# Patient Record
Sex: FEMALE | Race: WHITE | NOT HISPANIC OR LATINO | Employment: OTHER | URBAN - METROPOLITAN AREA
[De-identification: names, ages, dates, MRNs, and addresses within clinical notes are randomized per-mention and may not be internally consistent; named-entity substitution may affect disease eponyms.]

---

## 2017-11-03 ENCOUNTER — ALLSCRIPTS OFFICE VISIT (OUTPATIENT)
Dept: OTHER | Facility: OTHER | Age: 72
End: 2017-11-03

## 2017-11-03 ENCOUNTER — GENERIC CONVERSION - ENCOUNTER (OUTPATIENT)
Dept: OTHER | Facility: OTHER | Age: 72
End: 2017-11-03

## 2017-11-04 NOTE — PROGRESS NOTES
Assessment  1  Visit for screening mammogram (V76 12) (Z12 31)   2  Encounter for routine gynecological examination (V72 31) (Z01 419)    Pelvic exam revealed the uterus to be anterior mobile normal size  No adnexal masses are identified  Cervix is normal to appearance  The vaginal mucosa is atrophic  There is presence of adhesions in the posterior vagina  There are small cysts on the left labia majora  They are not infected whitish in color  They're not painful  Plan  Encounter for routine gynecological examination    · Follow-up visit in 1 year Evaluation and Treatment  Follow-up  Status: Hold For -  Scheduling  Requested for: [id number]   Ordered; For: Encounter for routine gynecological examination; Ordered By: [facility] Performed:  Due: 45NRQ4948  Encounter for screening mammogram for malignant neoplasm of breast    · * MAMMO SCREENING BILATERAL W CAD; Status:Hold For - Scheduling; Requested  for:57Ecv2514;    Perform:[facility]; [phone]; Ordered;For:Encounter for screening mammogram for malignant neoplasm of breast; Ordered By:Romel Munoz; No Pap smear was taken at this visit  The patient was given a slip for bilateral screening mammogram to be performed after December 9, 2017  She will return in one year for follow-up GYN evaluation due to her labial cysts  Discussion/Summary    The patient was told that her breast and pelvic exam were normal and that she did have 2 small sebaceous cysts on her left labia majora  This requires no treatment  She will call if any pain occurs in the area of the cysts  Chief Complaint  Pt is here for breast exam       History of Present Illness  HPI: This 77-year-old patient has had no vaginal bleeding or episodes of vaginitis over the past year  She has no pain with intercourse  Does have recurrent sinus infections during the course of the year and responds well to amoxicillin 500 mg every 8 hours for 10 days   She was given a prescription for this to be taken as needed for sinus infections with 5 refills  She will continue with Zoloft 50 mg a day  Her bowel and bladder function are normal    GYN HM, Adult Female [facility]: The patient is being seen for a gynecology evaluation  General Health: The patient's health since the last visit is described as good  Lifestyle:  She consumes a diverse and healthy diet  -- She does not have any weight concerns  -- She exercises regularly  She exercises 3 or more times per week  Exercise includes walking,-- aerobic conditioning-- and-- stretching/yoga/pilates  -- She does not use tobacco  The patient has never smoked cigarettes  -- She consumes alcohol  She reports occasional alcohol use  She typically drinks beer-- and-- wine  -- She denies drug use  She has never used illicit drugs  Reproductive health: the patient is postmenopausal--   she is sexually active  Screening: Cervical cancer screening includes a pap smear performed 10/28/16  Breast cancer screening includes a mammogram performed 12/9/16  Colorectal cancer screening includes a colonoscopy performed 2009  Metabolic screening includes uncertain timing of her last DEXA  Review of Systems    Constitutional: No fever, no chills, feels well, no tiredness, no recent weight gain or loss  ENT: no ear ache, no loss of hearing, no nosebleeds or nasal discharge, no sore throat or hoarseness  Cardiovascular: no complaints of slow or fast heart rate, no chest pain, no palpitations, no leg claudication or lower extremity edema  Respiratory: no complaints of shortness of breath, no wheezing, no dyspnea on exertion, no orthopnea or PND  Breasts: no complaints of breast pain, breast lump or nipple discharge  Gastrointestinal: no complaints of abdominal pain, no constipation, no nausea or diarrhea, no vomiting, no bloody stools     Genitourinary: no complaints of dysuria, no incontinence, no pelvic pain, no dysmenorrhea, no vaginal discharge or abnormal vaginal bleeding  Musculoskeletal: no complaints of arthralgia, no myalgia, no joint swelling or stiffness, no limb pain or swelling  Integumentary: no complaints of skin rash or lesion, no itching or dry skin, no skin wounds  Neurological: no complaints of headache, no confusion, no numbness or tingling, no dizziness or fainting  Active Problems  1  Anxiety (300 00) (F41 9)   2  Depression (311) (F32 9)   3  Encounter for routine gynecological examination (V72 31) (Z01 419)   4  Encounter for routine gynecological examination (V72 31) (Z01 419)   5  Encounter for screening mammogram for malignant neoplasm of breast (V76 12)   (Z12 31)   6  Hot Flashes   7  Pap smear, as part of routine gynecological examination (V76 2) (Z01 419)   8  Visit for screening mammogram (V76 12) (Z12 31)    Family History  Mother    · Family history of High cholesterol   · Family history of Hypertension  Father    · Family history of High cholesterol   · Family history of Hypertension    Social History   · Being A Social Drinker   · Childlessness, voluntary (V25 9) (Z30 9)   · History of Childlessness, voluntary (V25 9) (Z30 9)   · Exercising Regularly   · Never A Smoker    Current Meds   1  Lisinopril 10 MG Oral Tablet; Therapy: (Recorded:15Oct2013) to Recorded   2  Sertraline HCl - 50 MG Oral Tablet; Take 1 tablet daily; Therapy: 51NSP8725 to (03 17 74 30 53)  Requested for: 28Oct2016; Last   Rx:28Oct2016 Ordered    Allergies  1  No Known Drug Allergies    Vitals   Recorded: 76ZXO0578 80:89IA   Systolic 775, LUE, Sitting   Diastolic 60, LUE, Sitting   Height 5 ft 6 5 in   Weight 126 lb    BMI Calculated 20 03   BSA Calculated 1 65   LMP      Physical Exam    Constitutional   General appearance: No acute distress, well appearing and well nourished  Neck   Neck: Normal, supple, trachea midline, no masses  Thyroid: Normal, no thyromegaly      Pulmonary   Respiratory effort: No increased work of breathing or signs of respiratory distress  Auscultation of lungs: Clear to auscultation  Cardiovascular   Auscultation of heart: Normal rate and rhythm, normal S1 and S2, no murmurs  Peripheral vascular exam: Normal pulses Throughout  Genitourinary   External genitalia: Abnormal  -- There are 2 small whitish sebaceous cysts on the left labia majora  They're not painful or bleeding or drainage  Vagina: Normal, no lesions or dryness appreciated  Urethra: Normal     Urethral meatus: Normal     Bladder: Normal, soft, non-tender and no prolapse or masses appreciated  Cervix: Normal, no palpable masses  Uterus: Normal, non-tender, not enlarged, and no palpable masses  Adnexa/parametria: Normal, non-tender and no fullness or masses appreciated  Anus, perineum, and rectum: Normal sphincter tone, no masses, and no prolapse  Chest   Breasts: Normal and no dimpling or skin changes noted  Abdomen   Abdomen: Normal, non-tender, and no organomegaly noted  Liver and spleen: No hepatomegaly or splenomegaly  Examination for hernias: No hernias appreciated  Lymphatic   Palpation of lymph nodes in neck, axillae, groin and/or other locations: No lymphadenopathy or masses noted  Skin   Skin and subcutaneous tissue: Normal skin turgor and no rashes      Palpation of skin and subcutaneous tissue: Normal     Psychiatric   Orientation to person, place, and time: Normal     Mood and affect: Normal        Signatures   Electronically signed by : Milena Ellis MD; Nov  3 2017  1:25PM EST                       (Author)

## 2017-12-08 ENCOUNTER — HOSPITAL ENCOUNTER (OUTPATIENT)
Dept: RADIOLOGY | Facility: HOSPITAL | Age: 72
Discharge: HOME | End: 2017-12-08
Payer: COMMERCIAL

## 2017-12-08 DIAGNOSIS — Z12.31 ENCOUNTER FOR SCREENING MAMMOGRAM FOR MALIGNANT NEOPLASM OF BREAST: ICD-10-CM

## 2017-12-08 PROCEDURE — G0202 SCR MAMMO BI INCL CAD: HCPCS

## 2017-12-09 DIAGNOSIS — Z12.31 ENCOUNTER FOR SCREENING MAMMOGRAM FOR MALIGNANT NEOPLASM OF BREAST: ICD-10-CM

## 2018-01-10 NOTE — RESULT NOTES
Verified Results  * MAMMO SCREENING BILATERAL W CAD [id number] 12:32PM Lobito Love Order Number: [id number]    - Patient Instructions: To schedule this appointment, please contact Central Scheduling at [phone]  Do not wear any perfume, powder, lotion or deodorant on breast or underarm area  Please bring your doctors order, referral (if needed) and insurance information with you on the day of the test  Failure to bring this information may result in this test being rescheduled  Arrive 15 minutes prior to your appointment time to register  On the day of your test, please bring any prior mammogram or breast studies with you that were not performed at a [facility]  Failure to bring prior exams may result in your test needing to be rescheduled   Order Number: [id number]    - Patient Instructions: To schedule this appointment, please contact Central Scheduling at [phone]  Do not wear any perfume, powder, lotion or deodorant on breast or underarm area  Please bring your doctors order, referral (if needed) and insurance information with you on the day of the test  Failure to bring this information may result in this test being rescheduled  Arrive 15 minutes prior to your appointment time to register  On the day of your test, please bring any prior mammogram or breast studies with you that were not performed at a [facility]  Failure to bring prior exams may result in your test needing to be rescheduled  Test Name Result Flag Reference   MAMMO SCREENING BILATERAL W CAD (Report)     Patient History:   Patient is postmenopausal and is nulliparous  Family history of breast cancer in paternal cousin at age 72  Benign core biopsy of the left breast, 1994  Took estrogen for 10 years beginning at age 48  Took    progesterone for 10 years beginning at age 48  Patient has never smoked  Patient's BMI is 20 0       Reason for exam: screening (asymptomatic)  Mammo Screening Bilateral W CAD: December 9, 2016 - Check In #:    [de-identified]   Bilateral MLO, CC, and XCCL view(s) were taken  Technologist: RT Larisa(R)(M)   Prior study comparison: December 4, 2015, bilateral digital    screening mammogram performed at [address] [place] [address]     November 21, 2014, bilateral digital screening mammogram    performed at [address] [place] [address]  November 20, 2013,    bilateral digital screening mammogram performed at [address] [place] [address]  November 19, 2012, bilateral digital    screening mammogram performed at [address] [place] [address]      There are scattered fibroglandular densities  The parenchymal pattern appears stable  No dominant soft tissue    mass or suspicious calcifications are noted  The skin and nipple   contours are within normal limits  No mammographic evidence of malignancy  No    significant changes when compared with prior studies  ASSESSMENT: BiRad:1 - Negative     Recommendation:   Routine screening mammogram in 1 year  A reminder letter will be   scheduled  Analyzed by CAD     8-10% of cancers will be missed on mammography  Management of a    palpable abnormality must be based on clinical grounds  Patients   will be notified of their results via letter from our facility  Accredited by [employer] and FDA       Transcription Location: [facility] 98: [id number]     Risk Value(s):   Tyrer-Cuzick 10 Year: 2 893%, Tyrer-Cuzick Lifetime: 4 933%,    Myriad Table: 1 5%, BATOOL 5 Year: 2 2%, NCI Lifetime: 6 8%   Signed by:   Moises Davila MD   12/9/16

## 2018-01-13 NOTE — MISCELLANEOUS
Message   Recorded as Task   Date: 12/13/2016 10:53 AM, Created By: Samir Nuñez   Task Name: Med Renewal Request   Assigned To: Mickeythu Chongameena   Regarding Patient: Francisco Javier Sampson, Status: In Progress   Salvatore Short - 13 Dec 2016 10:53 AM     TASK CREATED  Caller: Self; [phone] (Home); [phone] (Work)  pt called - she needs a refill on her sertraline and she needs it to be for 3 months at a time, not one month at a time  pharmacy is [facility]  please advise [phone] or call pt at work until [address] [place] [address] - 13 Dec 2016 10:56 AM     TASK IN PROGRESS   Yris Jimenez - 13 Dec 2016 11:02 AM     TASK EDITED  lm for that refilled prescription  90 day, 3 r/f  Active Problems    1  Anxiety (300 00) (F41 9)   2  Depression (311) (F32 9)   3  Encounter for routine gynecological examination (V72 31) (Z01 419)   4  Encounter for routine gynecological examination (V72 31) (Z01 419)   5  Encounter for screening mammogram for malignant neoplasm of breast (V76 12)   (Z12 31)   6  Hot Flashes   7  Pap smear, as part of routine gynecological examination (V76 2) (Z01 419)   8  Visit for screening mammogram (V76 12) (Z12 31)    Current Meds   1  Lisinopril 10 MG Oral Tablet; Therapy: (Recorded:15Oct2013) to Recorded   2  Sertraline HCl - 50 MG Oral Tablet; TAKe 1 od; Therapy: 29AKZ0779 to (Evaluate:20Nmz1624)  Requested for: 12YTK4229; Last   Rx:25Mar2015 Ordered   3  Sertraline HCl - 50 MG Oral Tablet; Take 1 tablet daily; Therapy: 39BIV3715 to [phone] 86)  Requested for: 444 14 907; Last   Rx:26Oct2015 Ordered   4  Sertraline HCl - 50 MG Oral Tablet; Take 1 tablet daily; Therapy: 03YZG6374 to (03 17 74 30 53)  Requested for: 53Osz8179; Last   Rx:28Oct2016 Ordered    Allergies    1   No Known Drug Allergies    Plan  Anxiety, Depression    · Sertraline HCl - 50 MG Oral Tablet; TAKe 1 od    Signatures   Electronically signed by : Aiden Amaral, ; Dec 13 2016 11:02AM EST                       (Author)

## 2018-01-14 VITALS
HEIGHT: 67 IN | DIASTOLIC BLOOD PRESSURE: 60 MMHG | SYSTOLIC BLOOD PRESSURE: 140 MMHG | WEIGHT: 126 LBS | BODY MASS INDEX: 19.78 KG/M2

## 2018-01-16 NOTE — RESULT NOTES
Verified Results  (B) PAP (RFLX HPV PLUS WHEN ASCUS) NON-[place] 28Oct2016 01:26PM Ria Lopez     Test Name Result Flag Reference   PAP, LIQUID-BASED NILM     DIAGNOSIS:            Negative for intraepithelial lesion or malignancy  ADEQUACY:             Satisfactory for evaluation / Transformation zone                         component cannot be adequately evaluated due to                         atrophic smear  COMMENT:              This Pap smear was screened with the assistance                         of the CircularPrep(TM) Imaging System and                         screened by a cytotechnologist   SPECIMEN SOURCE:      PAP (RFLX HPV PLUS WHEN ASCUS) NON-[place], CERVIX  CLINICAL INFORMATION: LMP: N/A                        Post menopausal                        Provided Diagnosis Codes: Z01 419                                                Cervicovaginal cytology should be considered a                         screening procedure subject to false negatives                         and false positives  Results are more reliable                         when a satisfactory sample is obtained on a                         regular repetitive basis, and should be                         interpreted together with past and current                         clinical data    ELECTRONICALLY SIGNED   BY:                   Rescreened By: KARNE Vera (ASCP)   Case                         Electronically Signed 11/03/2016

## 2018-01-23 NOTE — RESULT NOTES
Verified Results  * MAMMO SCREENING BILATERAL W CAD [id number] 09:17AM Vida Armenta Order Number: [id number]    - Patient Instructions: To schedule this appointment, please contact Central Scheduling at [phone]  Do not wear any perfume, powder, lotion or deodorant on breast or underarm area  Please bring your doctors order, referral (if needed) and insurance information with you on the day of the test  Failure to bring this information may result in this test being rescheduled  Arrive 15 minutes prior to your appointment time to register  On the day of your test, please bring any prior mammogram or breast studies with you that were not performed at a [facility]  Failure to bring prior exams may result in your test needing to be rescheduled  Test Name Result Flag Reference   MAMMO SCREENING BILATERAL W CAD (Report)     Patient History:   Patient is postmenopausal and is nulliparous  Family history of breast cancer at age 72 in paternal cousin  Benign core biopsy of the left breast, 1994  Took estrogen for 10 years beginning at age 48  Took    progesterone for 10 years beginning at age 48  Patient has never smoked  Patient's BMI is 19 6  Reason for exam: screening, asymptomatic  Mammo Screening Bilateral W CAD: December 8, 2017 - Check In #:    [de-identified]   Bilateral MLO, CC, and XCCL view(s) were taken  Technologist: RT Alec(R)(M)   Prior study comparison: December 9, 2016, mammo screening    bilateral W CAD performed at [address] [place] [address]     December 4, 2015, bilateral digital screening mammogram performed   at [address] [place] [address]      There are scattered fibroglandular densities  The parenchymal pattern appears stable  No dominant soft tissue    mass or suspicious calcifications are noted  The skin and nipple   contours are within normal limits  No mammographic evidence of malignancy   No    significant changes when compared with prior studies  ACR BI-RADSï¾® Assessments: BiRad:1 - Negative     Recommendation:   Routine screening mammogram in 1 year  Analyzed by CAD     The patient is scheduled in a reminder system for screening    mammography  8-10% of cancers will be missed on mammography  Management of a    palpable abnormality must be based on clinical grounds  Patients   will be notified of their results via letter from our facility  Accredited by [employer] and FDA       Transcription Location: [facility] 98: [id number]     Risk Value(s):   Tyrer-Cuzick 10 Year: 2 800%, Tyrer-Cuzick Lifetime: 4 400%,    Myriad Table: 1 5%, BATOOL 5 Year: 2 2%, NCI Lifetime: 6 5%   Signed by:   Rafita Ha MD   12/8/17

## 2018-07-31 ENCOUNTER — TELEPHONE (OUTPATIENT)
Dept: FAMILY MEDICINE CLINIC | Facility: CLINIC | Age: 73
End: 2018-07-31

## 2018-07-31 NOTE — TELEPHONE ENCOUNTER
Called patient left her a message that the appointment for 9/14/18 at 8 am with dr Michael Julian is cancelled he will not be in the office this day and to call the office back to make another appointment for 6 mo to see dr Michael Julian

## 2018-09-07 ENCOUNTER — OFFICE VISIT (OUTPATIENT)
Dept: FAMILY MEDICINE CLINIC | Facility: CLINIC | Age: 73
End: 2018-09-07
Payer: COMMERCIAL

## 2018-09-07 ENCOUNTER — TELEPHONE (OUTPATIENT)
Dept: FAMILY MEDICINE CLINIC | Facility: CLINIC | Age: 73
End: 2018-09-07

## 2018-09-07 VITALS
BODY MASS INDEX: 19.98 KG/M2 | DIASTOLIC BLOOD PRESSURE: 80 MMHG | HEART RATE: 80 BPM | HEIGHT: 67 IN | WEIGHT: 127.3 LBS | RESPIRATION RATE: 18 BRPM | SYSTOLIC BLOOD PRESSURE: 130 MMHG

## 2018-09-07 DIAGNOSIS — K63.5 POLYP OF COLON, UNSPECIFIED PART OF COLON, UNSPECIFIED TYPE: ICD-10-CM

## 2018-09-07 DIAGNOSIS — Z91.09 POLLEN ALLERGIES: ICD-10-CM

## 2018-09-07 DIAGNOSIS — Z23 IMMUNIZATION DUE: ICD-10-CM

## 2018-09-07 DIAGNOSIS — M85.859 OSTEOPENIA OF NECK OF FEMUR, UNSPECIFIED LATERALITY: ICD-10-CM

## 2018-09-07 DIAGNOSIS — J30.9 ALLERGIC RHINITIS, UNSPECIFIED SEASONALITY, UNSPECIFIED TRIGGER: ICD-10-CM

## 2018-09-07 DIAGNOSIS — I10 ESSENTIAL HYPERTENSION: Primary | ICD-10-CM

## 2018-09-07 LAB
ANION GAP SERPL CALCULATED.3IONS-SCNC: 3 MMOL/L (ref 4–13)
BUN SERPL-MCNC: 18 MG/DL (ref 5–25)
CALCIUM SERPL-MCNC: 9.2 MG/DL (ref 8.3–10.1)
CHLORIDE SERPL-SCNC: 105 MMOL/L (ref 100–108)
CO2 SERPL-SCNC: 30 MMOL/L (ref 21–32)
CREAT SERPL-MCNC: 0.86 MG/DL (ref 0.6–1.3)
CREAT UR-MCNC: 206 MG/DL
GFR SERPL CREATININE-BSD FRML MDRD: 68 ML/MIN/1.73SQ M
GLUCOSE SERPL-MCNC: 97 MG/DL (ref 65–140)
MICROALBUMIN UR-MCNC: 15.5 MG/L (ref 0–20)
MICROALBUMIN/CREAT 24H UR: 8 MG/G CREATININE (ref 0–30)
POTASSIUM SERPL-SCNC: 5.6 MMOL/L (ref 3.5–5.3)
SODIUM SERPL-SCNC: 138 MMOL/L (ref 136–145)

## 2018-09-07 PROCEDURE — 90662 IIV NO PRSV INCREASED AG IM: CPT

## 2018-09-07 PROCEDURE — 99214 OFFICE O/P EST MOD 30 MIN: CPT | Performed by: FAMILY MEDICINE

## 2018-09-07 PROCEDURE — 36415 COLL VENOUS BLD VENIPUNCTURE: CPT | Performed by: FAMILY MEDICINE

## 2018-09-07 PROCEDURE — 1160F RVW MEDS BY RX/DR IN RCRD: CPT | Performed by: FAMILY MEDICINE

## 2018-09-07 PROCEDURE — 80048 BASIC METABOLIC PNL TOTAL CA: CPT | Performed by: FAMILY MEDICINE

## 2018-09-07 PROCEDURE — 3075F SYST BP GE 130 - 139MM HG: CPT | Performed by: FAMILY MEDICINE

## 2018-09-07 PROCEDURE — 82043 UR ALBUMIN QUANTITATIVE: CPT | Performed by: FAMILY MEDICINE

## 2018-09-07 PROCEDURE — 1160F RVW MEDS BY RX/DR IN RCRD: CPT

## 2018-09-07 PROCEDURE — 82570 ASSAY OF URINE CREATININE: CPT | Performed by: FAMILY MEDICINE

## 2018-09-07 PROCEDURE — G0008 ADMIN INFLUENZA VIRUS VAC: HCPCS

## 2018-09-07 PROCEDURE — 3079F DIAST BP 80-89 MM HG: CPT | Performed by: FAMILY MEDICINE

## 2018-09-07 PROCEDURE — 3008F BODY MASS INDEX DOCD: CPT | Performed by: FAMILY MEDICINE

## 2018-09-07 RX ORDER — OLOPATADINE HYDROCHLORIDE 7 MG/ML
SOLUTION OPHTHALMIC
COMMUNITY
Start: 2018-07-12 | End: 2018-09-07 | Stop reason: SDUPTHER

## 2018-09-07 RX ORDER — FLUOROMETHOLONE 0.1 %
SUSPENSION, DROPS(FINAL DOSAGE FORM)(ML) OPHTHALMIC (EYE) EVERY 24 HOURS
COMMUNITY
End: 2019-04-18 | Stop reason: ALTCHOICE

## 2018-09-07 RX ORDER — OLOPATADINE HYDROCHLORIDE 7 MG/ML
1 SOLUTION OPHTHALMIC DAILY
Qty: 1 BOTTLE | Refills: 12 | Status: SHIPPED | OUTPATIENT
Start: 2018-09-07 | End: 2020-01-10

## 2018-09-07 RX ORDER — LISINOPRIL 10 MG/1
TABLET ORAL
COMMUNITY
End: 2018-10-17 | Stop reason: SDUPTHER

## 2018-09-07 NOTE — PATIENT INSTRUCTIONS
Try to get colonoscopy completed after all the dental work  Continue your nice activity  IF YOU HAVE NEVER HAD A TDAP SHOT (TETANUS/DIPHTHERIA/PERTUSSIS)  TALK TO YOUR PHARMACIST ABOUT GETTING ONE : GOOD FOR 10 YRS:ESPECIALLY IMPORTATN IF YOU HAVE YOUNG CHILDREN OR GRANDCHILDREN    GET A YEARLY FLU SHOT IN THE FALL : IF OVER 65 GET "HIGH DOSE"     ASK PHARMACIST ABOUT "SHINGRIX" THE NEW SHINGLES VACCINE IF OVER AGE 50

## 2018-09-07 NOTE — TELEPHONE ENCOUNTER
Pt called stating she received a message but was not sure what it was for  Pt was here for office visit today  She was not sure if it was regarding bloodwork or urine  No message was in chart  She can be called at her work number [phone]

## 2018-09-07 NOTE — PROGRESS NOTES
Assessment/Plan:    Osteopenia  Last scan : 2012       Diagnoses and all orders for this visit:    Essential hypertension  -     influenza vaccine, [id number], quadrivalent, 0 5 mL, preservative-free (SYRINGE, SINGLE-DOSE VIAL), for adult and pediatric patients 3 yr+ (AFLURIA, FLUARIX, FLULAVAL, FLUZONE)  -     Microalbumin / creatinine urine ratio  -     Basic metabolic panel  -     DXA bone density spine hip and pelvis; Future    Allergic rhinitis, unspecified seasonality, unspecified trigger  -     PAZEO 0 7 % SOLN; Apply 1 drop to eye daily    Polyp of colon, unspecified part of colon, unspecified type    Osteopenia of neck of femur, unspecified laterality  -     DXA bone density spine hip and pelvis; Future    Pollen allergies  -     PAZEO 0 7 % SOLN; Apply 1 drop to eye daily          Subjective:      Patient ID: Kostas Thomas is a 72 y o  female  HPI    The following portions of the patient's history were reviewed and updated as appropriate: allergies, current medications, past family history, past medical history, past social history, past surgical history and problem list     Review of Systems   Constitutional: Negative for activity change and appetite change  HENT: Negative for voice change  Eyes: Negative for visual disturbance  Respiratory: Negative for chest tightness and shortness of breath  Cardiovascular: Negative for chest pain, palpitations and leg swelling  Gastrointestinal: Negative for abdominal pain, blood in stool, constipation and diarrhea  Genitourinary: Negative for dysuria, vaginal bleeding and vaginal discharge  Skin: Negative for rash  Neurological: Negative for dizziness  Psychiatric/Behavioral: Negative for dysphoric mood           Objective:  Vitals:    09/07/18 1042   BP: 130/80   BP Location: Left arm   Patient Position: Sitting   Cuff Size: Standard   Pulse: 80   Resp: 18   Weight: 57 7 kg (127 lb 4 8 oz)   Height: 5' 6 53" (1 69 m)      Physical Exam Constitutional: She appears well-developed and well-nourished  HENT:   Head: Normocephalic and atraumatic  Eyes: Conjunctivae are normal    Neck: Neck supple  No thyromegaly present  Cardiovascular: Normal rate, regular rhythm, normal heart sounds and intact distal pulses  No murmur heard  Pulmonary/Chest: Effort normal and breath sounds normal  No respiratory distress  Musculoskeletal: She exhibits no edema  Lymphadenopathy:     She has no cervical adenopathy  Skin: Skin is warm and dry  Psychiatric: She has a normal mood and affect  Her behavior is normal            Chronic medical issues are all stable today    We will secure a DEXA bone scan to follow-up the osteopenia medications are going to be the same surveillance labs were performed ER

## 2018-10-17 DIAGNOSIS — I10 ESSENTIAL HYPERTENSION, BENIGN: Primary | ICD-10-CM

## 2018-10-17 RX ORDER — LISINOPRIL 10 MG/1
10 TABLET ORAL DAILY
Qty: 90 TABLET | Refills: 3 | Status: SHIPPED | OUTPATIENT
Start: 2018-10-17 | End: 2019-09-23 | Stop reason: SDUPTHER

## 2018-11-16 ENCOUNTER — ANNUAL EXAM (OUTPATIENT)
Dept: OBGYN CLINIC | Facility: CLINIC | Age: 73
End: 2018-11-16
Payer: COMMERCIAL

## 2018-11-16 VITALS
DIASTOLIC BLOOD PRESSURE: 72 MMHG | BODY MASS INDEX: 20.89 KG/M2 | HEIGHT: 66 IN | SYSTOLIC BLOOD PRESSURE: 118 MMHG | WEIGHT: 130 LBS

## 2018-11-16 DIAGNOSIS — Z01.419 ENCOUNTER FOR GYNECOLOGICAL EXAMINATION (GENERAL) (ROUTINE) WITHOUT ABNORMAL FINDINGS: ICD-10-CM

## 2018-11-16 DIAGNOSIS — J32.9 CHRONIC SINUSITIS, UNSPECIFIED LOCATION: ICD-10-CM

## 2018-11-16 DIAGNOSIS — F41.9 ANXIETY: ICD-10-CM

## 2018-11-16 DIAGNOSIS — Z12.31 ENCOUNTER FOR SCREENING MAMMOGRAM FOR MALIGNANT NEOPLASM OF BREAST: Primary | ICD-10-CM

## 2018-11-16 PROCEDURE — S0612 ANNUAL GYNECOLOGICAL EXAMINA: HCPCS | Performed by: OBSTETRICS & GYNECOLOGY

## 2018-11-16 PROCEDURE — G0145 SCR C/V CYTO,THINLAYER,RESCR: HCPCS | Performed by: OBSTETRICS & GYNECOLOGY

## 2018-11-16 PROCEDURE — 4040F PNEUMOC VAC/ADMIN/RCVD: CPT | Performed by: OBSTETRICS & GYNECOLOGY

## 2018-11-16 RX ORDER — AMOXICILLIN 500 MG/1
500 CAPSULE ORAL EVERY 8 HOURS SCHEDULED
Qty: 30 CAPSULE | Refills: 5 | Status: SHIPPED | OUTPATIENT
Start: 2018-11-16 | End: 2018-11-26

## 2018-11-16 NOTE — PROGRESS NOTES
Assessment/Plan: This 79-year-old patient is seen today for her annual gyn evaluation  She has no specific complaints at this time  No problem-specific Assessment & Plan notes found for this encounter  Subjective:      Patient ID: Carrie  is a 72 y o  female  This 79-year-old patient has had no vaginal bleeding or episodes of vaginitis over the past year  She does use a lubricant with intercourse and is comfortable  She has no recurrence hot flashes headaches or fainting spells  She sleeps about 8 hours at night  Her bowel function is normal   She has had no urinary tract infections over the year  She does not wear liners or pads for urine stress incontinence  She has had 2 sinus infections over the past year  Gynecologic Exam             Review of Systems   Constitutional: Negative  HENT: Negative  Eyes: Negative  Respiratory: Negative  Cardiovascular: Negative  Gastrointestinal: Negative  Endocrine: Negative  Genitourinary: Negative  Musculoskeletal: Negative  Skin: Negative  Allergic/Immunologic: Negative  Neurological: Negative  Hematological: Negative  Psychiatric/Behavioral: Negative  Objective:      /72   Ht 5' 6" (1 676 m)   Wt 59 kg (130 lb)   BMI 20 98 kg/m²          Physical Exam   Constitutional: She is oriented to person, place, and time  She appears well-developed and well-nourished  HENT:   Head: Normocephalic  Neck: Normal range of motion  Neck supple  Cardiovascular: Normal rate, regular rhythm, normal heart sounds and intact distal pulses  Pulmonary/Chest: Effort normal and breath sounds normal    Abdominal: Soft  Bowel sounds are normal    Genitourinary: Uterus normal    Musculoskeletal: Normal range of motion  Neurological: She is alert and oriented to person, place, and time  Skin: Skin is warm and dry  Psychiatric: She has a normal mood and affect  Nursing note and vitals reviewed  breast exam is normal   Pelvic exam shows uterus to be anterior mobile normal size and well supported  No adnexal masses are identified  There is no blood or discharge in the vagina  The vulva is normal  Rectal exam shows no masses or blood in the rectum and no nodularity in the cul-de-sac

## 2018-11-16 NOTE — PATIENT INSTRUCTIONS
This 27-year-old patient was told that her breast and pelvic exam are normal  She will call if she sees any vaginal bleeding over the next year  She does have 2 white sebaceous cysts on the left labia majora which were there a year ago  There is no difference

## 2018-11-27 LAB
LAB AP GYN PRIMARY INTERPRETATION: NORMAL
Lab: NORMAL

## 2018-12-11 ENCOUNTER — TELEPHONE (OUTPATIENT)
Dept: FAMILY MEDICINE CLINIC | Facility: CLINIC | Age: 73
End: 2018-12-11

## 2018-12-11 NOTE — TELEPHONE ENCOUNTER
Called patient left a message that her appointment on 9/13/19 at 8 am with dr Tiffany Sanderson is cancelled that he will not be in the office this day and to call the office back to make another appointment to have physical done

## 2018-12-20 ENCOUNTER — HOSPITAL ENCOUNTER (OUTPATIENT)
Dept: RADIOLOGY | Facility: HOSPITAL | Age: 73
Discharge: HOME | End: 2018-12-20
Payer: COMMERCIAL

## 2018-12-20 VITALS — BODY MASS INDEX: 19.62 KG/M2 | HEIGHT: 67 IN | WEIGHT: 125 LBS

## 2018-12-20 DIAGNOSIS — Z12.31 ENCOUNTER FOR SCREENING MAMMOGRAM FOR MALIGNANT NEOPLASM OF BREAST: ICD-10-CM

## 2018-12-20 PROCEDURE — 77067 SCR MAMMO BI INCL CAD: CPT

## 2019-04-18 ENCOUNTER — TELEPHONE (OUTPATIENT)
Dept: FAMILY MEDICINE CLINIC | Facility: CLINIC | Age: 74
End: 2019-04-18

## 2019-04-18 ENCOUNTER — OFFICE VISIT (OUTPATIENT)
Dept: FAMILY MEDICINE CLINIC | Facility: CLINIC | Age: 74
End: 2019-04-18
Payer: COMMERCIAL

## 2019-04-18 VITALS
HEIGHT: 67 IN | HEART RATE: 80 BPM | WEIGHT: 135.4 LBS | SYSTOLIC BLOOD PRESSURE: 150 MMHG | BODY MASS INDEX: 21.25 KG/M2 | DIASTOLIC BLOOD PRESSURE: 80 MMHG | TEMPERATURE: 98 F

## 2019-04-18 DIAGNOSIS — R21 RASH: Primary | ICD-10-CM

## 2019-04-18 PROCEDURE — 3008F BODY MASS INDEX DOCD: CPT | Performed by: FAMILY MEDICINE

## 2019-04-18 PROCEDURE — 1101F PT FALLS ASSESS-DOCD LE1/YR: CPT | Performed by: FAMILY MEDICINE

## 2019-04-18 PROCEDURE — 99213 OFFICE O/P EST LOW 20 MIN: CPT | Performed by: FAMILY MEDICINE

## 2019-04-18 PROCEDURE — 1160F RVW MEDS BY RX/DR IN RCRD: CPT | Performed by: FAMILY MEDICINE

## 2019-04-18 RX ORDER — PREDNISONE 20 MG/1
40 TABLET ORAL DAILY
Qty: 10 TABLET | Refills: 0 | Status: SHIPPED | OUTPATIENT
Start: 2019-04-18 | End: 2019-04-23

## 2019-04-18 RX ORDER — TRIAMCINOLONE ACETONIDE 1 MG/G
CREAM TOPICAL 2 TIMES DAILY
Qty: 15 G | Refills: 2 | Status: SHIPPED | OUTPATIENT
Start: 2019-04-18 | End: 2020-01-10

## 2019-05-03 ENCOUNTER — TELEPHONE (OUTPATIENT)
Dept: FAMILY MEDICINE CLINIC | Facility: CLINIC | Age: 74
End: 2019-05-03

## 2019-06-07 ENCOUNTER — HOSPITAL ENCOUNTER (OUTPATIENT)
Dept: RADIOLOGY | Age: 74
Discharge: HOME | End: 2019-06-07
Payer: COMMERCIAL

## 2019-06-07 DIAGNOSIS — I10 ESSENTIAL HYPERTENSION: ICD-10-CM

## 2019-06-07 DIAGNOSIS — M85.859 OSTEOPENIA OF NECK OF FEMUR, UNSPECIFIED LATERALITY: ICD-10-CM

## 2019-06-07 PROCEDURE — 77080 DXA BONE DENSITY AXIAL: CPT

## 2019-06-10 ENCOUNTER — TELEPHONE (OUTPATIENT)
Dept: FAMILY MEDICINE CLINIC | Facility: CLINIC | Age: 74
End: 2019-06-10

## 2019-06-25 DIAGNOSIS — J30.9 ALLERGIC RHINITIS, UNSPECIFIED SEASONALITY, UNSPECIFIED TRIGGER: Primary | ICD-10-CM

## 2019-06-26 ENCOUNTER — TREATMENT (OUTPATIENT)
Dept: FAMILY MEDICINE CLINIC | Facility: CLINIC | Age: 74
End: 2019-06-26

## 2019-06-26 ENCOUNTER — TELEPHONE (OUTPATIENT)
Dept: FAMILY MEDICINE CLINIC | Facility: CLINIC | Age: 74
End: 2019-06-26

## 2019-06-26 DIAGNOSIS — J30.9 ALLERGIC RHINITIS, UNSPECIFIED SEASONALITY, UNSPECIFIED TRIGGER: Primary | ICD-10-CM

## 2019-06-26 RX ORDER — FLUTICASONE PROPIONATE 50 MCG
1 SPRAY, SUSPENSION (ML) NASAL DAILY
Qty: 1 BOTTLE | Refills: 6 | Status: SHIPPED | OUTPATIENT
Start: 2019-06-26 | End: 2019-09-20 | Stop reason: ALTCHOICE

## 2019-07-19 ENCOUNTER — OFFICE VISIT (OUTPATIENT)
Dept: FAMILY MEDICINE CLINIC | Facility: CLINIC | Age: 74
End: 2019-07-19
Payer: COMMERCIAL

## 2019-07-19 VITALS
HEIGHT: 67 IN | DIASTOLIC BLOOD PRESSURE: 66 MMHG | HEART RATE: 85 BPM | TEMPERATURE: 99.4 F | WEIGHT: 124.1 LBS | BODY MASS INDEX: 19.48 KG/M2 | SYSTOLIC BLOOD PRESSURE: 150 MMHG | OXYGEN SATURATION: 97 %

## 2019-07-19 DIAGNOSIS — M81.0 AGE-RELATED OSTEOPOROSIS WITHOUT CURRENT PATHOLOGICAL FRACTURE: Primary | ICD-10-CM

## 2019-07-19 LAB — 25(OH)D3 SERPL-MCNC: 27 NG/ML (ref 30–100)

## 2019-07-19 PROCEDURE — 36415 COLL VENOUS BLD VENIPUNCTURE: CPT | Performed by: FAMILY MEDICINE

## 2019-07-19 PROCEDURE — 82306 VITAMIN D 25 HYDROXY: CPT | Performed by: FAMILY MEDICINE

## 2019-07-19 PROCEDURE — 99402 PREV MED CNSL INDIV APPRX 30: CPT | Performed by: FAMILY MEDICINE

## 2019-07-19 NOTE — PROGRESS NOTES
Assessment/Plan:    No problem-specific Assessment & Plan notes found for this encounter  There are no diagnoses linked to this encounter  Subjective:      Patient ID: Za Reyna is a 72 y o  female  Here to discuss  Dexa scan : moved from 'penia to 'porosis since last time   Intollerant of fosamax  In past although took for 3yrs   Good exerciser / no FH   The following portions of the patient's history were reviewed and updated as appropriate: allergies, current medications, past family history, past medical history, past social history, past surgical history and problem list     Review of Systems      Objective:  Vitals:    07/19/19 0902   BP: 150/66   BP Location: Left arm   Patient Position: Sitting   Cuff Size: Adult   Pulse: 85   Temp: 99 4 °F (37 4 °C)   TempSrc: Temporal   SpO2: 97%   Weight: 56 3 kg (124 lb 1 6 oz)   Height: 5' 6 5" (1 689 m)      Physical Exam      More than half of this 30 minute visit spent counseling and coordinating care

## 2019-08-28 ENCOUNTER — OFFICE VISIT (OUTPATIENT)
Dept: FAMILY MEDICINE CLINIC | Facility: CLINIC | Age: 74
End: 2019-08-28
Payer: COMMERCIAL

## 2019-08-28 VITALS
HEIGHT: 66 IN | WEIGHT: 131 LBS | TEMPERATURE: 97.5 F | HEART RATE: 98 BPM | SYSTOLIC BLOOD PRESSURE: 120 MMHG | BODY MASS INDEX: 21.05 KG/M2 | DIASTOLIC BLOOD PRESSURE: 78 MMHG

## 2019-08-28 DIAGNOSIS — R21 RASH OF NECK: ICD-10-CM

## 2019-08-28 DIAGNOSIS — R21 RASH OF FACE: Primary | ICD-10-CM

## 2019-08-28 PROCEDURE — 1160F RVW MEDS BY RX/DR IN RCRD: CPT | Performed by: NURSE PRACTITIONER

## 2019-08-28 PROCEDURE — 3008F BODY MASS INDEX DOCD: CPT | Performed by: NURSE PRACTITIONER

## 2019-08-28 PROCEDURE — 1036F TOBACCO NON-USER: CPT | Performed by: NURSE PRACTITIONER

## 2019-08-28 PROCEDURE — 99213 OFFICE O/P EST LOW 20 MIN: CPT | Performed by: NURSE PRACTITIONER

## 2019-08-28 PROCEDURE — 3725F SCREEN DEPRESSION PERFORMED: CPT | Performed by: NURSE PRACTITIONER

## 2019-08-28 RX ORDER — LORATADINE 10 MG/1
10 TABLET ORAL DAILY
Qty: 30 TABLET | Refills: 1 | Status: SHIPPED | OUTPATIENT
Start: 2019-08-28 | End: 2019-09-20 | Stop reason: SDUPTHER

## 2019-08-28 RX ORDER — PREDNISONE 20 MG/1
40 TABLET ORAL DAILY
Qty: 10 TABLET | Refills: 0 | Status: SHIPPED | OUTPATIENT
Start: 2019-08-28 | End: 2019-09-20 | Stop reason: ALTCHOICE

## 2019-08-28 NOTE — PROGRESS NOTES
Assessment/Plan:         Diagnoses and all orders for this visit:    Rash of face  -     predniSONE 20 mg tablet; Take 2 tablets (40 mg total) by mouth daily  -     hydrocortisone 2 5 % cream; Apply topically 2 (two) times a day Use only for 5 -7 days  -     loratadine (CLARITIN) 10 mg tablet; Take 1 tablet (10 mg total) by mouth daily  Benadryl at bedtime as needed  Rash of neck          Subjective:      Patient ID: Mily Bhandari is a 69 y o  female  HPI    Has a rash on face for most of August   No change in anything   No new chemicals on face  Rash only on face and neck  Using flonase only   She thinks it is allergy related  Itchy and inflammed and dry    Never blisters, never wet or open        The following portions of the patient's history were reviewed and updated as appropriate: allergies, current medications, past family history, past medical history, past social history, past surgical history and problem list     Review of Systems   Constitutional: Negative for activity change, appetite change and fatigue  HENT: Positive for rhinorrhea  Negative for congestion, postnasal drip and tinnitus  Respiratory: Negative for cough  Cardiovascular: Negative for chest pain  Skin: Positive for rash  Neurological: Negative for dizziness, light-headedness and numbness  Objective:  Vitals:    08/28/19 1631   BP: 120/78   BP Location: Left arm   Patient Position: Sitting   Cuff Size: Adult   Pulse: 98   Temp: 97 5 °F (36 4 °C)   TempSrc: Temporal   Weight: 59 4 kg (131 lb)   Height: 5' 6 5" (1 689 m)      Physical Exam   Constitutional: She appears well-developed and well-nourished  Skin:        Vitals reviewed

## 2019-09-06 ENCOUNTER — OFFICE VISIT (OUTPATIENT)
Dept: FAMILY MEDICINE CLINIC | Facility: CLINIC | Age: 74
End: 2019-09-06
Payer: COMMERCIAL

## 2019-09-06 VITALS
TEMPERATURE: 99.1 F | DIASTOLIC BLOOD PRESSURE: 70 MMHG | SYSTOLIC BLOOD PRESSURE: 118 MMHG | HEIGHT: 66 IN | HEART RATE: 90 BPM | WEIGHT: 131 LBS | BODY MASS INDEX: 21.05 KG/M2

## 2019-09-06 DIAGNOSIS — R21 RASH: ICD-10-CM

## 2019-09-06 DIAGNOSIS — J30.9 ALLERGIC RHINITIS, UNSPECIFIED SEASONALITY, UNSPECIFIED TRIGGER: Primary | ICD-10-CM

## 2019-09-06 PROCEDURE — 3008F BODY MASS INDEX DOCD: CPT | Performed by: NURSE PRACTITIONER

## 2019-09-06 PROCEDURE — 99213 OFFICE O/P EST LOW 20 MIN: CPT | Performed by: NURSE PRACTITIONER

## 2019-09-06 RX ORDER — MONTELUKAST SODIUM 10 MG/1
10 TABLET ORAL
Qty: 30 TABLET | Refills: 5 | Status: SHIPPED | OUTPATIENT
Start: 2019-09-06 | End: 2019-09-20 | Stop reason: ALTCHOICE

## 2019-09-06 NOTE — PROGRESS NOTES
Assessment/Plan:         Diagnoses and all orders for this visit:    Allergic rhinitis, unspecified seasonality, unspecified trigger  -     montelukast (SINGULAIR) 10 mg tablet; Take 1 tablet (10 mg total) by mouth daily at bedtime  Take along with the claritin  Rash  Will use the hydrocortisone topical for a few days   Let me know how doing  Other orders  -     sertraline (ZOLOFT) 50 mg tablet; Take 50 mg by mouth daily      Referral to Errol loomis if needed     Subjective:      Patient ID: Edvin Davis is a 69 y o  female  HPI  Here for follow up on the rash on her face  Was here prior and treated with prednisone and claritin   Weaning off of the zoloft  Rash cleared with the prednisone   Was clear Monday and Tuesday  And now starting back up  Likes the claritin and is helping the itching  She is thinkg related to her allergies   Flares in summer    Highly allergic    The following portions of the patient's history were reviewed and updated as appropriate: allergies, current medications, past family history, past medical history, past social history, past surgical history and problem list     Review of Systems   Constitutional: Negative for activity change, appetite change and fatigue  Cardiovascular: Negative for chest pain  Genitourinary: Negative for frequency  Skin: Positive for rash  Neurological: Negative for weakness, light-headedness and numbness  Objective:  Vitals:    09/06/19 1428   BP: 118/70   BP Location: Left arm   Patient Position: Sitting   Cuff Size: Adult   Pulse: 90   Temp: 99 1 °F (37 3 °C)   TempSrc: Temporal   Weight: 59 4 kg (131 lb)   Height: 5' 6 5" (1 689 m)      Physical Exam   Constitutional: She appears well-developed and well-nourished  Skin:        Vitals reviewed

## 2019-09-20 ENCOUNTER — TELEPHONE (OUTPATIENT)
Dept: FAMILY MEDICINE CLINIC | Facility: CLINIC | Age: 74
End: 2019-09-20

## 2019-09-20 ENCOUNTER — OFFICE VISIT (OUTPATIENT)
Dept: FAMILY MEDICINE CLINIC | Facility: CLINIC | Age: 74
End: 2019-09-20
Payer: COMMERCIAL

## 2019-09-20 VITALS — HEIGHT: 67 IN | BODY MASS INDEX: 20.45 KG/M2 | WEIGHT: 130.3 LBS

## 2019-09-20 DIAGNOSIS — K63.5 POLYP OF COLON, UNSPECIFIED PART OF COLON, UNSPECIFIED TYPE: Primary | ICD-10-CM

## 2019-09-20 DIAGNOSIS — I10 ESSENTIAL HYPERTENSION: ICD-10-CM

## 2019-09-20 DIAGNOSIS — M81.0 AGE-RELATED OSTEOPOROSIS WITHOUT CURRENT PATHOLOGICAL FRACTURE: ICD-10-CM

## 2019-09-20 DIAGNOSIS — E78.00 HYPERCHOLESTEROLEMIA: ICD-10-CM

## 2019-09-20 DIAGNOSIS — J30.9 ALLERGIC RHINITIS, UNSPECIFIED SEASONALITY, UNSPECIFIED TRIGGER: ICD-10-CM

## 2019-09-20 DIAGNOSIS — R21 RASH OF FACE: ICD-10-CM

## 2019-09-20 LAB
ANION GAP SERPL CALCULATED.3IONS-SCNC: 4 MMOL/L (ref 4–13)
BUN SERPL-MCNC: 22 MG/DL (ref 5–25)
CALCIUM SERPL-MCNC: 9.3 MG/DL (ref 8.3–10.1)
CHLORIDE SERPL-SCNC: 105 MMOL/L (ref 100–108)
CO2 SERPL-SCNC: 28 MMOL/L (ref 21–32)
CREAT SERPL-MCNC: 0.82 MG/DL (ref 0.6–1.3)
GFR SERPL CREATININE-BSD FRML MDRD: 72 ML/MIN/1.73SQ M
GLUCOSE SERPL-MCNC: 87 MG/DL (ref 65–140)
POTASSIUM SERPL-SCNC: 4.9 MMOL/L (ref 3.5–5.3)
SODIUM SERPL-SCNC: 137 MMOL/L (ref 136–145)

## 2019-09-20 PROCEDURE — 1170F FXNL STATUS ASSESSED: CPT | Performed by: FAMILY MEDICINE

## 2019-09-20 PROCEDURE — 82043 UR ALBUMIN QUANTITATIVE: CPT | Performed by: FAMILY MEDICINE

## 2019-09-20 PROCEDURE — 82570 ASSAY OF URINE CREATININE: CPT | Performed by: FAMILY MEDICINE

## 2019-09-20 PROCEDURE — G0439 PPPS, SUBSEQ VISIT: HCPCS | Performed by: FAMILY MEDICINE

## 2019-09-20 PROCEDURE — 1125F AMNT PAIN NOTED PAIN PRSNT: CPT | Performed by: FAMILY MEDICINE

## 2019-09-20 PROCEDURE — 80048 BASIC METABOLIC PNL TOTAL CA: CPT | Performed by: FAMILY MEDICINE

## 2019-09-20 PROCEDURE — 36415 COLL VENOUS BLD VENIPUNCTURE: CPT | Performed by: FAMILY MEDICINE

## 2019-09-20 PROCEDURE — 99214 OFFICE O/P EST MOD 30 MIN: CPT | Performed by: FAMILY MEDICINE

## 2019-09-20 RX ORDER — LORATADINE 10 MG/1
10 TABLET ORAL DAILY
Qty: 30 TABLET | Refills: 12 | Status: SHIPPED | OUTPATIENT
Start: 2019-09-20 | End: 2020-10-16 | Stop reason: SDUPTHER

## 2019-09-20 RX ORDER — KETOCONAZOLE 20 MG/G
CREAM TOPICAL
Refills: 0 | COMMUNITY
Start: 2019-09-17 | End: 2020-01-17 | Stop reason: SDUPTHER

## 2019-09-20 NOTE — PROGRESS NOTES
Assessment and Plan:     Problem List Items Addressed This Visit     None           Preventive health issues were discussed with patient, and age appropriate screening tests were ordered as noted in patient's After Visit Summary  Personalized health advice and appropriate referrals for health education or preventive services given if needed, as noted in patient's After Visit Summary  History of Present Illness:     Patient presents for Medicare Annual Wellness visit    Patient Care Team:  Delvin Donovan MD as PCP - General     Problem List:     Patient Active Problem List   Diagnosis    Allergic rhinitis    Anxiety    Colon polyp    Essential hypertension    Hypercholesterolemia    Osteopenia    Rash    Age-related osteoporosis without current pathological fracture    Hot flashes due to menopause    Depression      Past Medical and Surgical History:     Past Medical History:   Diagnosis Date    Allergic      History reviewed  No pertinent surgical history     Family History:     Family History   Problem Relation Age of Onset    Breast cancer Cousin 72    Breast cancer Other 59      Social History:     Social History     Socioeconomic History    Marital status: Single     Spouse name: None    Number of children: None    Years of education: None    Highest education level: None   Occupational History    None   Social Needs    Financial resource strain: None    Food insecurity:     Worry: None     Inability: None    Transportation needs:     Medical: None     Non-medical: None   Tobacco Use    Smoking status: Never Smoker    Smokeless tobacco: Never Used   Substance and Sexual Activity    Alcohol use: Yes     Frequency: 2-4 times a month    Drug use: No    Sexual activity: Yes     Partners: Male   Lifestyle    Physical activity:     Days per week: None     Minutes per session: None    Stress: None   Relationships    Social connections:     Talks on phone: None     Gets together: None Attends Voodoo service: None     Active member of club or organization: None     Attends meetings of clubs or organizations: None     Relationship status: None    Intimate partner violence:     Fear of current or ex partner: None     Emotionally abused: None     Physically abused: None     Forced sexual activity: None   Other Topics Concern    None   Social History Narrative    None       Medications and Allergies:     Current Outpatient Medications   Medication Sig Dispense Refill    b complex vitamins tablet Take 1 tablet by mouth daily      Calcium Carbonate-Vitamin D3 (CALCIUM 600+D3) 600-400 MG-UNIT TABS 2 DAILY      hydrocortisone 2 5 % cream Apply topically 2 (two) times a day Use only for 5 -7 days 30 g 0    ketoconazole (NIZORAL) 2 % cream APPLY TO RASH ON NECK TWO TIMES A DAY  0    lisinopril (ZESTRIL) 10 mg tablet Take 1 tablet (10 mg total) by mouth daily 90 tablet 3    loratadine (CLARITIN) 10 mg tablet Take 1 tablet (10 mg total) by mouth daily 30 tablet 1    PAZEO 0 7 % SOLN Apply 1 drop to eye daily 1 Bottle 12    triamcinolone (KENALOG) 0 1 % cream Apply topically 2 (two) times a day For poison ivy 15 g 2     No current facility-administered medications for this visit        Allergies   Allergen Reactions    Lorazepam     No Active Allergies     Other     Oxycodone       Immunizations:     Immunization History   Administered Date(s) Administered    INFLUENZA 11/05/2015, 09/08/2016, 09/15/2017    Influenza Split 10/22/2010, 11/18/2011, 11/28/2012, 09/10/2013, 11/26/2014, 11/05/2015    Influenza Split High Dose Preservative Free IM 09/08/2016, 09/15/2017    Influenza TIV (IM) 10/11/2007, 10/08/2008, 09/25/2009    Influenza, high dose seasonal 0 5 mL 09/07/2018    Pneumococcal Conjugate 13-Valent 05/07/2015    Pneumococcal Polysaccharide PPV23 11/28/2012    Tdap 10/08/2008    Zoster 04/01/2016, 08/05/2016      Health Maintenance:         Topic Date Due    Hepatitis C Screening  1945    CRC Screening: Colonoscopy  1945    MAMMOGRAM  12/20/2019         Topic Date Due    DTaP,Tdap,and Td Vaccines (2 - Td) 10/08/2018    INFLUENZA VACCINE  07/01/2019      Medicare Health Risk Assessment:     Ht 5' 6 54" (1 69 m)   Wt 59 1 kg (130 lb 4 8 oz)   BMI 20 69 kg/m²      Gladys Brooks is here for her Subsequent Wellness visit  Last Medicare Wellness visit information reviewed, patient interviewed and updates made to the record today  Health Risk Assessment:   Patient rates overall health as good  Patient feels that their physical health rating is same  Eyesight was rated as slightly worse  Hearing was rated as same  Pain experienced in the last 7 days has been none  Patient states that she has experienced no weight loss or gain in last 6 months  Fall Risk Screening: In the past year, patient has experienced: no history of falling in past year      Urinary Incontinence Screening:   Patient has not leaked urine accidently in the last six months  Home Safety:  Patient does not have trouble with stairs inside or outside of their home  Patient has working smoke alarms and has no working carbon monoxide detector  Home safety hazards include: none  Nutrition:   Current diet is Regular  Medications:   Patient is currently taking over-the-counter supplements  OTC medications include: see medication list  Patient is able to manage medications  Activities of Daily Living (ADLs)/Instrumental Activities of Daily Living (IADLs):   Walk and transfer into and out of bed and chair?: Yes  Dress and groom yourself?: Yes    Bathe or shower yourself?: Yes    Feed yourself?  Yes  Do your laundry/housekeeping?: Yes  Manage your money, pay your bills and track your expenses?: Yes  Make your own meals?: Yes    Do your own shopping?: Yes    Previous Hospitalizations:   Any hospitalizations or ED visits within the last 12 months?: No      Advance Care Planning:   Living will: No PREVENTIVE SCREENINGS      Cardiovascular Screening:    General: Screening Not Indicated and History Lipid Disorder      Breast Cancer Screening:     General: Screening Current      Cervical Cancer Screening:    General: Screening Not Indicated      Osteoporosis Screening:    General: Screening Not Indicated and History Osteoporosis      Lung Cancer Screening:     General: Screening Not Indicated      Chris Franco MD

## 2019-09-20 NOTE — PATIENT INSTRUCTIONS
Medicare Preventive Visit Patient Instructions  Thank you for completing your Welcome to Medicare Visit or Medicare Annual Wellness Visit today  Your next wellness visit will be due in one year (9/20/2020)  The screening/preventive services that you may require over the next 5-10 years are detailed below  Some tests may not apply to you based off risk factors and/or age  Screening tests ordered at today's visit but not completed yet may show as past due  Also, please note that scanned in results may not display below  Preventive Screenings:  Service Recommendations Previous Testing/Comments   Colorectal Cancer Screening  * Colonoscopy    * Fecal Occult Blood Test (FOBT)/Fecal Immunochemical Test (FIT)  * Fecal DNA/Cologuard Test  * Flexible Sigmoidoscopy Age: 54-65 years old   Colonoscopy: every 10 years (may be performed more frequently if at higher risk)  OR  FOBT/FIT: every 1 year  OR  Cologuard: every 3 years  OR  Sigmoidoscopy: every 5 years  Screening may be recommended earlier than age 48 if at higher risk for colorectal cancer  Also, an individualized decision between you and your healthcare provider will decide whether screening between the ages of 74-80 would be appropriate  Colonoscopy: Not on file  FOBT/FIT: Not on file  Cologuard: Not on file  Sigmoidoscopy: Not on file         Breast Cancer Screening Age: 36 years old  Frequency: every 1-2 years  Not required if history of left and right mastectomy Mammogram: 12/20/2018    Screening Current   Cervical Cancer Screening Between the ages of 21-29, pap smear recommended once every 3 years  Between the ages of 33-67, can perform pap smear with HPV co-testing every 5 years     Recommendations may differ for women with a history of total hysterectomy, cervical cancer, or abnormal pap smears in past  Pap Smear: 11/16/2018    Screening Not Indicated   Hepatitis C Screening Once for adults born between 1945 and 1965  More frequently in patients at high risk for Hepatitis C Hep C Antibody: Not on file       Diabetes Screening 1-2 times per year if you're at risk for diabetes or have pre-diabetes Fasting glucose: No results in last 5 years   A1C: No results in last 5 years       Cholesterol Screening Once every 5 years if you don't have a lipid disorder  May order more often based on risk factors  Lipid panel: Not on file    Screening Not Indicated  History Lipid Disorder     Other Preventive Screenings Covered by Medicare:  1  Abdominal Aortic Aneurysm (AAA) Screening: covered once if your at risk  You're considered to be at risk if you have a family history of AAA  2  Lung Cancer Screening: covers low dose CT scan once per year if you meet all of the following conditions: (1) Age 50-69; (2) No signs or symptoms of lung cancer; (3) Current smoker or have quit smoking within the last 15 years; (4) You have a tobacco smoking history of at least 30 pack years (packs per day multiplied by number of years you smoked); (5) You get a written order from a healthcare provider  3  Glaucoma Screening: covered annually if you're considered high risk: (1) You have diabetes OR (2) Family history of glaucoma OR (3)  aged 48 and older OR (3)  American aged 72 and older  3  Osteoporosis Screening: covered every 2 years if you meet one of the following conditions: (1) You're estrogen deficient and at risk for osteoporosis based off medical history and other findings; (2) Have a vertebral abnormality; (3) On glucocorticoid therapy for more than 3 months; (4) Have primary hyperparathyroidism; (5) On osteoporosis medications and need to assess response to drug therapy  · Last bone density test (DXA Scan): 06/07/2019   5  HIV Screening: covered annually if you're between the age of 15-65  Also covered annually if you are younger than 13 and older than 72 with risk factors for HIV infection   For pregnant patients, it is covered up to 3 times per pregnancy  Immunizations:  Immunization Recommendations   Influenza Vaccine Annual influenza vaccination during flu season is recommended for all persons aged >= 6 months who do not have contraindications   Pneumococcal Vaccine (Prevnar and Pneumovax)  * Prevnar = PCV13  * Pneumovax = PPSV23   Adults 25-60 years old: 1-3 doses may be recommended based on certain risk factors  Adults 72 years old: Prevnar (PCV13) vaccine recommended followed by Pneumovax (PPSV23) vaccine  If already received PPSV23 since turning 65, then PCV13 recommended at least one year after PPSV23 dose  Hepatitis B Vaccine 3 dose series if at intermediate or high risk (ex: diabetes, end stage renal disease, liver disease)   Tetanus (Td) Vaccine - COST NOT COVERED BY MEDICARE PART B Following completion of primary series, a booster dose should be given every 10 years to maintain immunity against tetanus  Td may also be given as tetanus wound prophylaxis  Tdap Vaccine - COST NOT COVERED BY MEDICARE PART B Recommended at least once for all adults  For pregnant patients, recommended with each pregnancy  Shingles Vaccine (Shingrix) - COST NOT COVERED BY MEDICARE PART B  2 shot series recommended in those aged 48 and above     Health Maintenance Due:      Topic Date Due    Hepatitis C Screening  1945    CRC Screening: Colonoscopy  1945    MAMMOGRAM  12/20/2019     Immunizations Due:      Topic Date Due    DTaP,Tdap,and Td Vaccines (2 - Td) 10/08/2018    INFLUENZA VACCINE  07/01/2019     Advance Directives   What are advance directives? Advance directives are legal documents that state your wishes and plans for medical care  These plans are made ahead of time in case you lose your ability to make decisions for yourself  Advance directives can apply to any medical decision, such as the treatments you want, and if you want to donate organs  What are the types of advance directives?   There are many types of advance directives, and each state has rules about how to use them  You may choose a combination of any of the following:  · Living will: This is a written record of the treatment you want  You can also choose which treatments you do not want, which to limit, and which to stop at a certain time  This includes surgery, medicine, IV fluid, and tube feedings  · Durable power of  for healthcare [facility]): This is a written record that states who you want to make healthcare choices for you when you are unable to make them for yourself  This person, called a proxy, is usually a family member or a friend  You may choose more than 1 proxy  · Do not resuscitate (DNR) order:  A DNR order is used in case your heart stops beating or you stop breathing  It is a request not to have certain forms of treatment, such as CPR  A DNR order may be included in other types of advance directives  · Medical directive: This covers the care that you want if you are in a coma, near death, or unable to make decisions for yourself  You can list the treatments you want for each condition  Treatment may include pain medicine, surgery, blood transfusions, dialysis, IV or tube feedings, and a ventilator (breathing machine)  · Values history: This document has questions about your views, beliefs, and how you feel and think about life  This information can help others choose the care that you would choose  Why are advance directives important? An advance directive helps you control your care  Although spoken wishes may be used, it is better to have your wishes written down  Spoken wishes can be misunderstood, or not followed  Treatments may be given even if you do not want them  An advance directive may make it easier for your family to make difficult choices about your care  © Copyright [employer] 2018 Information is for End User's use only and may not be sold, redistributed or otherwise used for commercial purposes   All illustrations and images included in CareNotes® are the copyrighted property of A D A M , Inc  or [employer] (TETANUS/DIPHTHERIA/PERTUSSIS)  TALK TO YOUR PHARMACIST ABOUT GETTING ONE : GOOD FOR 10 YRS:ESPECIALLY IMPORTATN IF YOU HAVE YOUNG CHILDREN OR GRANDCHILDREN    Adult DT now due   GET A YEARLY FLU SHOT IN THE FALL : IF OVER 65 GET "HIGH DOSE"     ASK PHARMACIST ABOUT "SHINGRIX" THE NEW SHINGLES VACCINE IF OVER AGE 50          Medicare Preventive Visit Patient Instructions  Thank you for completing your Welcome to Medicare Visit or Medicare Annual Wellness Visit today  Your next wellness visit will be due in one year (9/20/2020)  The screening/preventive services that you may require over the next 5-10 years are detailed below  Some tests may not apply to you based off risk factors and/or age  Screening tests ordered at today's visit but not completed yet may show as past due  Also, please note that scanned in results may not display below  Preventive Screenings:  Service Recommendations Previous Testing/Comments   Colorectal Cancer Screening  * Colonoscopy    * Fecal Occult Blood Test (FOBT)/Fecal Immunochemical Test (FIT)  * Fecal DNA/Cologuard Test  * Flexible Sigmoidoscopy Age: 54-65 years old   Colonoscopy: every 10 years (may be performed more frequently if at higher risk)  OR  FOBT/FIT: every 1 year  OR  Cologuard: every 3 years  OR  Sigmoidoscopy: every 5 years  Screening may be recommended earlier than age 48 if at higher risk for colorectal cancer  Also, an individualized decision between you and your healthcare provider will decide whether screening between the ages of 74-80 would be appropriate   Colonoscopy: Not on file  FOBT/FIT: Not on file  Cologuard: Not on file  Sigmoidoscopy: Not on file    Risks and Benefits Discussed     Breast Cancer Screening Age: 36 years old  Frequency: every 1-2 years  Not required if history of left and right mastectomy Mammogram: 12/20/2018    Screening Current  Screening Current   Cervical Cancer Screening Between the ages of 21-29, pap smear recommended once every 3 years  Between the ages of 33-67, can perform pap smear with HPV co-testing every 5 years  Recommendations may differ for women with a history of total hysterectomy, cervical cancer, or abnormal pap smears in past  Pap Smear: 11/16/2018    Screening Not Indicated  Screening Current   Hepatitis C Screening Once for adults born between 1945 and 1965  More frequently in patients at high risk for Hepatitis C Hep C Antibody: Not on file    Screening Not Indicated   Diabetes Screening 1-2 times per year if you're at risk for diabetes or have pre-diabetes Fasting glucose: No results in last 5 years   A1C: No results in last 5 years    Screening Current   Cholesterol Screening Once every 5 years if you don't have a lipid disorder  May order more often based on risk factors  Lipid panel: Not on file    Screening Not Indicated  History Lipid Disorder  Screening Not Indicated  History Lipid Disorder  Risks and Benefits Discussed  Screening Current     Other Preventive Screenings Covered by Medicare:  6  Abdominal Aortic Aneurysm (AAA) Screening: covered once if your at risk  You're considered to be at risk if you have a family history of AAA  7  Lung Cancer Screening: covers low dose CT scan once per year if you meet all of the following conditions: (1) Age 50-69; (2) No signs or symptoms of lung cancer; (3) Current smoker or have quit smoking within the last 15 years; (4) You have a tobacco smoking history of at least 30 pack years (packs per day multiplied by number of years you smoked); (5) You get a written order from a healthcare provider  8  Glaucoma Screening: covered annually if you're considered high risk: (1) You have diabetes OR (2) Family history of glaucoma OR (3)  aged 48 and older OR (3)  American aged 72 and older  5   Osteoporosis Screening: covered every 2 years if you meet one of the following conditions: (1) You're estrogen deficient and at risk for osteoporosis based off medical history and other findings; (2) Have a vertebral abnormality; (3) On glucocorticoid therapy for more than 3 months; (4) Have primary hyperparathyroidism; (5) On osteoporosis medications and need to assess response to drug therapy  · Last bone density test (DXA Scan): 06/07/2019   10  HIV Screening: covered annually if you're between the age of 15-65  Also covered annually if you are younger than 13 and older than 72 with risk factors for HIV infection  For pregnant patients, it is covered up to 3 times per pregnancy  Immunizations:  Immunization Recommendations   Influenza Vaccine Annual influenza vaccination during flu season is recommended for all persons aged >= 6 months who do not have contraindications   Pneumococcal Vaccine (Prevnar and Pneumovax)  * Prevnar = PCV13  * Pneumovax = PPSV23   Adults 25-60 years old: 1-3 doses may be recommended based on certain risk factors  Adults 72 years old: Prevnar (PCV13) vaccine recommended followed by Pneumovax (PPSV23) vaccine  If already received PPSV23 since turning 65, then PCV13 recommended at least one year after PPSV23 dose  Hepatitis B Vaccine 3 dose series if at intermediate or high risk (ex: diabetes, end stage renal disease, liver disease)   Tetanus (Td) Vaccine - COST NOT COVERED BY MEDICARE PART B Following completion of primary series, a booster dose should be given every 10 years to maintain immunity against tetanus  Td may also be given as tetanus wound prophylaxis  Tdap Vaccine - COST NOT COVERED BY MEDICARE PART B Recommended at least once for all adults  For pregnant patients, recommended with each pregnancy     Shingles Vaccine (Shingrix) - COST NOT COVERED BY MEDICARE PART B  2 shot series recommended in those aged 48 and above     Health Maintenance Due:      Topic Date Due    Hepatitis C Screening  1945    CRC Screening: Colonoscopy  1945    MAMMOGRAM  12/20/2019     Immunizations Due:      Topic Date Due    DTaP,Tdap,and Td Vaccines (2 - Td) 10/08/2018    INFLUENZA VACCINE  07/01/2019     Advance Directives   What are advance directives? Advance directives are legal documents that state your wishes and plans for medical care  These plans are made ahead of time in case you lose your ability to make decisions for yourself  Advance directives can apply to any medical decision, such as the treatments you want, and if you want to donate organs  What are the types of advance directives? There are many types of advance directives, and each state has rules about how to use them  You may choose a combination of any of the following:  · Living will: This is a written record of the treatment you want  You can also choose which treatments you do not want, which to limit, and which to stop at a certain time  This includes surgery, medicine, IV fluid, and tube feedings  · Durable power of  for healthcare [facility]): This is a written record that states who you want to make healthcare choices for you when you are unable to make them for yourself  This person, called a proxy, is usually a family member or a friend  You may choose more than 1 proxy  · Do not resuscitate (DNR) order:  A DNR order is used in case your heart stops beating or you stop breathing  It is a request not to have certain forms of treatment, such as CPR  A DNR order may be included in other types of advance directives  · Medical directive: This covers the care that you want if you are in a coma, near death, or unable to make decisions for yourself  You can list the treatments you want for each condition  Treatment may include pain medicine, surgery, blood transfusions, dialysis, IV or tube feedings, and a ventilator (breathing machine)  · Values history:   This document has questions about your views, beliefs, and how you feel and think about life  This information can help others choose the care that you would choose  Why are advance directives important? An advance directive helps you control your care  Although spoken wishes may be used, it is better to have your wishes written down  Spoken wishes can be misunderstood, or not followed  Treatments may be given even if you do not want them  An advance directive may make it easier for your family to make difficult choices about your care  © Copyright [employer] 2018 Information is for End User's use only and may not be sold, redistributed or otherwise used for commercial purposes   All illustrations and images included in CareNotes® are the copyrighted property of [employer]  or [address] [place] [address]

## 2019-09-20 NOTE — PROGRESS NOTES
Assessment/Plan:    No problem-specific Assessment & Plan notes found for this encounter  Diagnoses and all orders for this visit:    Polyp of colon, unspecified part of colon, unspecified type    Allergic rhinitis, unspecified seasonality, unspecified trigger    Essential hypertension    Age-related osteoporosis without current pathological fracture    Hypercholesterolemia    Rash of face    Other orders  -     ketoconazole (NIZORAL) 2 % cream; APPLY TO RASH ON NECK TWO TIMES A DAY          Subjective:      Patient ID: Kaz Torres is a 69 y o  female  PATIENT RETURNS FOR FOLLOW-UP OF CHRONIC MEDICAL CONDITIONS  NO HOSPITAL STAYS OR EMERGENCY VISITS RECENTLY  MEDS WERE REVIEWED AND NO SIDE EFFECTS  NO NEW ISSUES  UNLESS NOTED BELOW  NO NEW MEDICAL PROVIDER REPORTED  THE CHRONIC DISEASES LISTED ABOVE ARE STABLE AND UNCHANGED/ THE PLAN OF CARE FOR THOSE WILL REMAIN UNCHANGED UNLESS NOTED BELOW  AWV:sub       The following portions of the patient's history were reviewed and updated as appropriate: allergies, current medications, past family history, past medical history, past social history, past surgical history and problem list     Review of Systems   Constitutional: Negative for activity change and appetite change  HENT: Negative for voice change  Eyes: Negative for visual disturbance  Respiratory: Negative for chest tightness and shortness of breath  Cardiovascular: Negative for chest pain, palpitations and leg swelling  Gastrointestinal: Negative for abdominal pain, blood in stool, constipation and diarrhea  Genitourinary: Negative for dysuria, vaginal bleeding and vaginal discharge  Skin: Negative for rash  Neurological: Negative for dizziness  Psychiatric/Behavioral: Negative for dysphoric mood           Objective:  Vitals:    09/20/19 0910   Weight: 59 1 kg (130 lb 4 8 oz)   Height: 5' 6 54" (1 69 m)      Physical Exam   Constitutional: She appears well-developed and well-nourished  HENT:   Head: Normocephalic and atraumatic  Eyes: Conjunctivae are normal    Neck: Neck supple  No thyromegaly present  Cardiovascular: Normal rate, regular rhythm, normal heart sounds and intact distal pulses  No murmur heard  Pulmonary/Chest: Effort normal and breath sounds normal  No respiratory distress  Musculoskeletal: She exhibits no edema  Lymphadenopathy:     She has no cervical adenopathy  Skin: Skin is warm and dry  Psychiatric: She has a normal mood and affect  Her behavior is normal          Patient's chronic problems that were reviewed today are stable  Meds reviewed and no changes made  Appropriate labs and imaging were ordered  Preventive measures appropriate for age and sex were reviewed with patient  Immunizations were updated as appropriate  Assessment and Plan:     Problem List Items Addressed This Visit        Digestive    Colon polyp - Primary       Respiratory    Allergic rhinitis       Cardiovascular and Mediastinum    Essential hypertension       Musculoskeletal and Integument    Age-related osteoporosis without current pathological fracture       Other    Hypercholesterolemia      Other Visit Diagnoses     Rash of face        Relevant Medications    ketoconazole (NIZORAL) 2 % cream           Preventive health issues were discussed with patient, and age appropriate screening tests were ordered as noted in patient's After Visit Summary  Personalized health advice and appropriate referrals for health education or preventive services given if needed, as noted in patient's After Visit Summary       History of Present Illness:     Patient presents for Medicare Annual Wellness visit    Patient Care Team:  Tj James MD as PCP - General     Problem List:     Patient Active Problem List   Diagnosis    Allergic rhinitis    Anxiety    Colon polyp    Essential hypertension    Hypercholesterolemia    Osteopenia    Rash    Age-related osteoporosis without current pathological fracture    Hot flashes due to menopause    Depression      Past Medical and Surgical History:     Past Medical History:   Diagnosis Date    Allergic      History reviewed  No pertinent surgical history     Family History:     Family History   Problem Relation Age of Onset    Breast cancer Cousin 72    Breast cancer Other 59      Social History:     Social History     Socioeconomic History    Marital status: Single     Spouse name: None    Number of children: None    Years of education: None    Highest education level: None   Occupational History    None   Social Needs    Financial resource strain: None    Food insecurity:     Worry: None     Inability: None    Transportation needs:     Medical: None     Non-medical: None   Tobacco Use    Smoking status: Never Smoker    Smokeless tobacco: Never Used   Substance and Sexual Activity    Alcohol use: Yes     Frequency: 2-4 times a month    Drug use: No    Sexual activity: Yes     Partners: Male   Lifestyle    Physical activity:     Days per week: None     Minutes per session: None    Stress: None   Relationships    Social connections:     Talks on phone: None     Gets together: None     Attends Christian service: None     Active member of club or organization: None     Attends meetings of clubs or organizations: None     Relationship status: None    Intimate partner violence:     Fear of current or ex partner: None     Emotionally abused: None     Physically abused: None     Forced sexual activity: None   Other Topics Concern    None   Social History Narrative    None       Medications and Allergies:     Current Outpatient Medications   Medication Sig Dispense Refill    b complex vitamins tablet Take 1 tablet by mouth daily      Calcium Carbonate-Vitamin D3 (CALCIUM 600+D3) 600-400 MG-UNIT TABS 2 DAILY      hydrocortisone 2 5 % cream Apply topically 2 (two) times a day Use only for 5 -7 days 30 g 0    ketoconazole (NIZORAL) 2 % cream APPLY TO RASH ON NECK TWO TIMES A DAY  0    lisinopril (ZESTRIL) 10 mg tablet Take 1 tablet (10 mg total) by mouth daily 90 tablet 3    loratadine (CLARITIN) 10 mg tablet Take 1 tablet (10 mg total) by mouth daily 30 tablet 1    PAZEO 0 7 % SOLN Apply 1 drop to eye daily 1 Bottle 12    triamcinolone (KENALOG) 0 1 % cream Apply topically 2 (two) times a day For poison ivy 15 g 2     No current facility-administered medications for this visit  Allergies   Allergen Reactions    Lorazepam     No Active Allergies     Other     Oxycodone       Immunizations:     Immunization History   Administered Date(s) Administered    INFLUENZA 11/05/2015, 09/08/2016, 09/15/2017    Influenza Split 10/22/2010, 11/18/2011, 11/28/2012, 09/10/2013, 11/26/2014, 11/05/2015    Influenza Split High Dose Preservative Free IM 09/08/2016, 09/15/2017    Influenza TIV (IM) 10/11/2007, 10/08/2008, 09/25/2009    Influenza, high dose seasonal 0 5 mL 09/07/2018    Pneumococcal Conjugate 13-Valent 05/07/2015    Pneumococcal Polysaccharide PPV23 11/28/2012    Tdap 10/08/2008    Zoster 04/01/2016, 08/05/2016      Health Maintenance:         Topic Date Due    Hepatitis C Screening  1945    CRC Screening: Colonoscopy  1945    MAMMOGRAM  12/20/2019         Topic Date Due    DTaP,Tdap,and Td Vaccines (2 - Td) 10/08/2018    INFLUENZA VACCINE  07/01/2019      Medicare Health Risk Assessment:     Ht 5' 6 54" (1 69 m)   Wt 59 1 kg (130 lb 4 8 oz)   BMI 20 69 kg/m²      Vianney Bowen is here for her Subsequent Wellness visit  Last Medicare Wellness visit information reviewed, patient interviewed, no change since last AWV       Advance Care Planning:   Living will: No    Durable POA for healthcare: No    Advanced directive: No      Comments: Documents given     PREVENTIVE SCREENINGS      Cardiovascular Screening:    General: Screening Not Indicated, History Lipid Disorder, Risks and Benefits Discussed and Screening Current      Diabetes Screening:     General: Screening Current      Colorectal Cancer Screening:     General: Risks and Benefits Discussed      Breast Cancer Screening:     General: Screening Current      Cervical Cancer Screening:    General: Screening Current      Osteoporosis Screening:    General: Screening Not Indicated, History Osteoporosis and Screening Current      Abdominal Aortic Aneurysm (AAA) Screening:        General: Screening Not Indicated      Lung Cancer Screening:     General: Screening Not Indicated      Hepatitis C Screening:    General: Screening Not Indicated      Preventive Screening Comments: N/a     Other Counseling Topics:   Calcium and vitamin D intake and regular weightbearing exercise         Dino Bennett MD

## 2019-09-21 LAB
CREAT UR-MCNC: 119 MG/DL
MICROALBUMIN UR-MCNC: 10.3 MG/L (ref 0–20)
MICROALBUMIN/CREAT 24H UR: 9 MG/G CREATININE (ref 0–30)

## 2019-09-23 DIAGNOSIS — I10 ESSENTIAL HYPERTENSION, BENIGN: ICD-10-CM

## 2019-09-23 RX ORDER — LISINOPRIL 10 MG/1
TABLET ORAL
Qty: 90 TABLET | Refills: 3 | Status: SHIPPED | OUTPATIENT
Start: 2019-09-23 | End: 2020-09-23

## 2019-11-08 ENCOUNTER — CLINICAL SUPPORT (OUTPATIENT)
Dept: FAMILY MEDICINE CLINIC | Facility: CLINIC | Age: 74
End: 2019-11-08
Payer: COMMERCIAL

## 2019-11-08 DIAGNOSIS — Z23 NEED FOR INFLUENZA VACCINATION: Primary | ICD-10-CM

## 2019-11-08 PROCEDURE — G0008 ADMIN INFLUENZA VIRUS VAC: HCPCS | Performed by: FAMILY MEDICINE

## 2019-11-08 PROCEDURE — 90662 IIV NO PRSV INCREASED AG IM: CPT | Performed by: FAMILY MEDICINE

## 2019-12-23 ENCOUNTER — HOSPITAL ENCOUNTER (OUTPATIENT)
Dept: RADIOLOGY | Facility: HOSPITAL | Age: 74
Discharge: HOME | End: 2019-12-23
Payer: COMMERCIAL

## 2019-12-23 ENCOUNTER — ANNUAL EXAM (OUTPATIENT)
Dept: OBGYN CLINIC | Facility: CLINIC | Age: 74
End: 2019-12-23
Payer: COMMERCIAL

## 2019-12-23 VITALS
BODY MASS INDEX: 21.53 KG/M2 | HEIGHT: 66 IN | WEIGHT: 134 LBS | SYSTOLIC BLOOD PRESSURE: 132 MMHG | DIASTOLIC BLOOD PRESSURE: 78 MMHG

## 2019-12-23 DIAGNOSIS — Z12.39 SCREENING BREAST EXAMINATION: ICD-10-CM

## 2019-12-23 DIAGNOSIS — Z12.11 COLON CANCER SCREENING: Primary | ICD-10-CM

## 2019-12-23 DIAGNOSIS — Z12.31 ENCOUNTER FOR SCREENING MAMMOGRAM FOR MALIGNANT NEOPLASM OF BREAST: ICD-10-CM

## 2019-12-23 PROCEDURE — G0101 CA SCREEN;PELVIC/BREAST EXAM: HCPCS | Performed by: STUDENT IN AN ORGANIZED HEALTH CARE EDUCATION/TRAINING PROGRAM

## 2019-12-23 PROCEDURE — 77067 SCR MAMMO BI INCL CAD: CPT

## 2019-12-23 RX ORDER — LORATADINE 10 MG/1
TABLET ORAL
Refills: 12 | COMMUNITY
Start: 2019-09-20 | End: 2019-12-23

## 2019-12-23 NOTE — PROGRESS NOTES
Established patient annual exam - Gynecology   Mary Wells 69 y o  female MRN: [id number]  [birth year] M  [facility]  Encounter: [id number]    Chief Complaint   Patient presents with   Emaline Folds Gynecologic Exam     annual exam  No concerns  Last pap: 18-neg  Last mammo: 18-neg  She is going for a mammo today for 2019  Last dexa scan was 19- osteoporosis  ,, recc  repeat one in 18-24 months  Exercises daily and feels good  History of Present Illness     Meghana Bucio is a 69 y o  female  No LMP recorded (lmp unknown)  Patient is postmenopausal  She presents for annual exam     Concerns today: none  No postmenopausal bleeding, currently sexually active, using lubricant with good results    REVIEW OF SYSTEMS  CONSTITUTIONAL:  No weight loss, fever, chills, weakness  HEENT: No visual loss, blurred vision  SKIN: No rash or itching  CARDIOVASCULAR: No chest pain, chest pressure, or chest discomfort  RESPIRATORY: No shortness of breath, cough or sputum  GASTROINTESTINAL: No nausea, emesis, or diarrhea  NEUROLOGICAL: No headache, dizziness, syncope, paralysis  MUSCUOSKELETAL: No muscle, back pain, joint stiffness or bruising  INFECTIOUS: No fever, chills  PSYCHIATRIC: No disorder of thought or mood  HEMATOLOGIC: No easy bruising or bleeding  GYN: No postmenopausal bleeding  No involuntary urine loss  No pain with intercourse  Some vaginal dryness    Past Medical History:   Diagnosis Date    Allergic        Patient Active Problem List   Diagnosis    Allergic rhinitis    Anxiety    Colon polyp    Essential hypertension    Hypercholesterolemia    Osteopenia    Rash    Age-related osteoporosis without current pathological fracture    Hot flashes due to menopause    Depression       History reviewed  No pertinent surgical history      OB History        0    Para   0    Term   0       0    AB   0    Living   0       SAB   0    TAB   0 Ectopic   0    Multiple   0    Live Births   0                 No OB History data found     Never been pregnant    GYN History    No history abnormal Pap smears  No history of cryotherapy, LEEP, or cold knife cone of the cervix    Health maintenance  Last pap smear: 11/16/2018   Bone density scan: 6/14/2019 osteoporosis  Last mammogram: 12/20/2018 negative, scheduled for today  Last colonoscopy: Not on file Per patient in her early 62s, benign polyps   Reports she is due for another  HPV vaccination?: no    Family History   Problem Relation Age of Onset    Breast cancer Cousin 72    Breast cancer Other 59       Social History   Social History     Substance and Sexual Activity   Alcohol Use Yes     Social History     Substance and Sexual Activity   Drug Use No     Social History     Tobacco Use   Smoking Status Never Smoker   Smokeless Tobacco Never Used       Sexually active? yes  Current sexual partner(s): male  History of STD: no  Interested in STD screening today? no  Concerns about sex: none    Occupation: works in [employer] part time for [employer]      Current Outpatient Medications:     b complex vitamins tablet, Take 1 tablet by mouth daily, Disp: , Rfl:     Calcium Carbonate-Vitamin D3 (CALCIUM 600+D3) 600-400 MG-UNIT TABS, 2 DAILY, Disp: , Rfl:     hydrocortisone 2 5 % cream, Apply topically 2 (two) times a day Use only for 5 -7 days, Disp: 30 g, Rfl: 0    ketoconazole (NIZORAL) 2 % cream, APPLY TO RASH ON NECK TWO TIMES A DAY, Disp: , Rfl: 0    lisinopril (ZESTRIL) 10 mg tablet, TAKE ONE TABLET BY MOUTH EVERY DAY, Disp: 90 tablet, Rfl: 3    loratadine (CLARITIN) 10 mg tablet, Take 1 tablet (10 mg total) by mouth daily, Disp: 30 tablet, Rfl: 12    PAZEO 0 7 % SOLN, Apply 1 drop to eye daily, Disp: 1 Bottle, Rfl: 12    triamcinolone (KENALOG) 0 1 % cream, Apply topically 2 (two) times a day For poison ivy (Patient not taking: Reported on 12/23/2019), Disp: 15 g, Rfl: 2    Allergies   Allergen Reactions    Lorazepam     No Active Allergies     Other     Oxycodone        Objective   Vitals: Blood pressure 132/78, height 5' 6 25" (1 683 m), weight 60 8 kg (134 lb)  Body mass index is 21 47 kg/m²  General: NAD, AAOx3  Heart: RRR  Lungs: CTAB  Neck: supple, no thyromegaly or thyroid nodules appreciated    Breast: nipples everted bilaterally, no skin changes  No dimpling, redness, or erythema  No breast masses or axillary masses bilaterally  Fibrocystic breast changes  Abdomen: soft, non-distended, non tender to palpation  Extremities: non-tender to palpation  Speculum exam: Normal appearing external genitalia, normal hair distribution  Urethra well-suspended, no clitoromegaly noted  Vagina pale, minimal rugation  Minimal discharge noted  Cervix without lesion, postmenopausal appearing  No blood in vaginal vault    Pelvic exam: No cervical motion tenderness  No adnexal masses or tenderness  anteverted uterus, normal size  Lab Results:   No visits with results within 1 Day(s) from this visit  Latest known visit with results is:   Office Visit on 09/20/2019   Component Date Value    Sodium 09/20/2019 137     Potassium 09/20/2019 4 9     Chloride 09/20/2019 105     CO2 09/20/2019 28     ANION GAP 09/20/2019 4     BUN 09/20/2019 22     Creatinine 09/20/2019 0 82     Glucose 09/20/2019 87     Calcium 09/20/2019 9 3     eGFR 09/20/2019 72     Creatinine, Ur 09/20/2019 119 0     Microalbum  ,U,Random 09/20/2019 10 3     Microalb Creat Ratio 09/20/2019 9         Assessment/Plan     69 y o  Sandrita Lindsay with normal annual gynecologic examination  Diagnoses and all orders for this visit:    Colon cancer screening  -     Ambulatory referral to Gastroenterology; Future  - Referral sent to Dr Kavya Velasquez, with whom [facility] had a positive experience previously    Encounter for screening mammogram for malignant neoplasm of breast  -     Mammo screening bilateral w cad;  Future  - Scheduled for mammo today    Reviewed importance of letting us know if she has any postmenopausal bleeding, okay to skip Pap smear this year - next due 2021    Follow up PRN or for one year annual exam

## 2020-01-10 ENCOUNTER — OFFICE VISIT (OUTPATIENT)
Dept: FAMILY MEDICINE CLINIC | Facility: CLINIC | Age: 75
End: 2020-01-10
Payer: COMMERCIAL

## 2020-01-10 VITALS
RESPIRATION RATE: 18 BRPM | HEART RATE: 88 BPM | SYSTOLIC BLOOD PRESSURE: 110 MMHG | DIASTOLIC BLOOD PRESSURE: 70 MMHG | WEIGHT: 131 LBS | BODY MASS INDEX: 20.56 KG/M2 | HEIGHT: 67 IN

## 2020-01-10 DIAGNOSIS — L23.9 ALLERGIC DERMATITIS: Primary | ICD-10-CM

## 2020-01-10 DIAGNOSIS — Z11.59 SCREENING FOR VIRAL DISEASE: ICD-10-CM

## 2020-01-10 PROCEDURE — 3725F SCREEN DEPRESSION PERFORMED: CPT | Performed by: NURSE PRACTITIONER

## 2020-01-10 PROCEDURE — 99213 OFFICE O/P EST LOW 20 MIN: CPT | Performed by: NURSE PRACTITIONER

## 2020-01-10 RX ORDER — PREDNISONE 20 MG/1
40 TABLET ORAL DAILY
Qty: 10 TABLET | Refills: 0 | Status: SHIPPED | OUTPATIENT
Start: 2020-01-10 | End: 2020-01-17 | Stop reason: ALTCHOICE

## 2020-01-10 NOTE — PROGRESS NOTES
Assessment/Plan:    No problem-specific Assessment & Plan notes found for this encounter  Diagnoses and all orders for this visit:    Screening for viral disease  -     Hepatitis C antibody          Subjective:      Patient ID: Adelaide Bustillo is a 69 y o  female  HPI  Here for a rash on her face  Has a hx of allergic response in past due to allergies    This is different  Came day after cleaning bathroom   Burning and red on face only  Dry and scaling and inflammed  The following portions of the patient's history were reviewed and updated as appropriate: allergies, current medications, past family history, past medical history, past social history, past surgical history and problem list     Review of Systems   Constitutional: Negative for activity change, appetite change, chills and fatigue  HENT: Negative for congestion and sore throat  Respiratory: Negative for cough  Gastrointestinal: Negative for constipation and diarrhea  Genitourinary: Negative for urgency  Skin: Positive for rash  Neurological: Negative for dizziness  Psychiatric/Behavioral: The patient is not nervous/anxious  Objective:  Vitals:    01/10/20 0818   BP: 110/70   BP Location: Left arm   Patient Position: Sitting   Cuff Size: Standard   Pulse: 88   Resp: 18   Weight: 59 4 kg (131 lb)   Height: 5' 7" (1 702 m)      Physical Exam   Constitutional: She appears well-developed and well-nourished  Skin:        Vitals reviewed

## 2020-01-17 ENCOUNTER — OFFICE VISIT (OUTPATIENT)
Dept: FAMILY MEDICINE CLINIC | Facility: CLINIC | Age: 75
End: 2020-01-17
Payer: COMMERCIAL

## 2020-01-17 VITALS
TEMPERATURE: 97.9 F | DIASTOLIC BLOOD PRESSURE: 80 MMHG | WEIGHT: 126 LBS | HEIGHT: 67 IN | BODY MASS INDEX: 19.78 KG/M2 | SYSTOLIC BLOOD PRESSURE: 130 MMHG

## 2020-01-17 DIAGNOSIS — R21 RASH: Primary | ICD-10-CM

## 2020-01-17 PROCEDURE — 99213 OFFICE O/P EST LOW 20 MIN: CPT | Performed by: FAMILY MEDICINE

## 2020-01-17 PROCEDURE — 1160F RVW MEDS BY RX/DR IN RCRD: CPT | Performed by: FAMILY MEDICINE

## 2020-01-17 RX ORDER — KETOCONAZOLE 20 MG/G
CREAM TOPICAL 2 TIMES DAILY
Qty: 15 G | Refills: 6 | Status: SHIPPED | OUTPATIENT
Start: 2020-01-17 | End: 2020-10-16 | Stop reason: SDUPTHER

## 2020-01-17 NOTE — PATIENT INSTRUCTIONS
You may add hydrocortisone 2 and 0 5% cream peer current regimen  Once things are under good control consider backing off on the creams to maybe every other day or 2 or 3 times a week  During an acute flare treat with the ketoconazole plus the hydrocortisone plus the antihistamines altogether as soon as the rash begins to start  Then continue those treatments throughout the season that usually aggravates

## 2020-01-17 NOTE — PROGRESS NOTES
Assessment/Plan:    No problem-specific Assessment & Plan notes found for this encounter  Diagnoses and all orders for this visit:    Rash          Subjective:      Patient ID: Pawan Rios is a 69 y o  female  F/U : rash : combo ketoconazole / pred works well ; The following portions of the patient's history were reviewed and updated as appropriate: allergies, current medications, past family history, past medical history, past social history, past surgical history and problem list     Review of Systems   Skin: Positive for rash  Objective:  Vitals:    01/17/20 0759   BP: 130/80   BP Location: Left arm   Patient Position: Sitting   Cuff Size: Adult   Temp: 97 9 °F (36 6 °C)   TempSrc: Temporal   Weight: 57 2 kg (126 lb)   Height: 5' 7" (1 702 m)      Physical Exam   Skin: Rash noted  There is erythema           Add HC 2 5%

## 2020-01-23 ENCOUNTER — OFFICE VISIT (OUTPATIENT)
Dept: FAMILY MEDICINE CLINIC | Facility: CLINIC | Age: 75
End: 2020-01-23
Payer: COMMERCIAL

## 2020-01-23 VITALS
DIASTOLIC BLOOD PRESSURE: 90 MMHG | SYSTOLIC BLOOD PRESSURE: 150 MMHG | OXYGEN SATURATION: 98 % | BODY MASS INDEX: 20.64 KG/M2 | HEIGHT: 67 IN | HEART RATE: 114 BPM | WEIGHT: 131.5 LBS

## 2020-01-23 DIAGNOSIS — R21 RASH: Primary | ICD-10-CM

## 2020-01-23 PROCEDURE — 3077F SYST BP >= 140 MM HG: CPT | Performed by: FAMILY MEDICINE

## 2020-01-23 PROCEDURE — 3008F BODY MASS INDEX DOCD: CPT | Performed by: FAMILY MEDICINE

## 2020-01-23 PROCEDURE — 99213 OFFICE O/P EST LOW 20 MIN: CPT | Performed by: FAMILY MEDICINE

## 2020-01-23 PROCEDURE — 1036F TOBACCO NON-USER: CPT | Performed by: FAMILY MEDICINE

## 2020-01-23 PROCEDURE — 1160F RVW MEDS BY RX/DR IN RCRD: CPT | Performed by: FAMILY MEDICINE

## 2020-01-23 PROCEDURE — 3080F DIAST BP >= 90 MM HG: CPT | Performed by: FAMILY MEDICINE

## 2020-01-23 RX ORDER — PREDNISONE 20 MG/1
40 TABLET ORAL DAILY
Qty: 10 TABLET | Refills: 0 | Status: SHIPPED | OUTPATIENT
Start: 2020-01-23 | End: 2020-01-28

## 2020-01-23 NOTE — PROGRESS NOTES
Assessment/Plan:    No problem-specific Assessment & Plan notes found for this encounter  Diagnoses and all orders for this visit:    Rash          Subjective:      Patient ID: Mayra Win is a 69 y o  female  Facial rash worse ; complete resolution w/ pred/           The following portions of the patient's history were reviewed and updated as appropriate: allergies, current medications, past family history, past medical history, past social history, past surgical history and problem list     Review of Systems   Skin: Positive for rash  Objective:  Vitals:    01/23/20 1458   BP: 150/90   BP Location: Left arm   Patient Position: Sitting   Cuff Size: Adult   Pulse: (!) 114   SpO2: 98%   Weight: 59 6 kg (131 lb 8 oz)   Height: 5' 7" (1 702 m)      Physical Exam   Skin: Rash noted  There is erythema  This is clearly a steroid responsive dermatitis most likely seborrhea    Could be some element of perioral or facial dermatitis as well

## 2020-01-23 NOTE — PATIENT INSTRUCTIONS
Take prednisone for 5 days  Simultaneously use the hydrocortisone 2 and 0 5% cream twice daily to the face  In 3 weeks if everything is good reduce the hydrocortisone to once daily for 10 days if everything is still good go every other day for 10 days and then possibly even stop the cream       If it flares up with any dosage reduction then go back to the dose that was working in keep that going

## 2020-03-27 ENCOUNTER — OFFICE VISIT (OUTPATIENT)
Dept: FAMILY MEDICINE CLINIC | Facility: CLINIC | Age: 75
End: 2020-03-27
Payer: COMMERCIAL

## 2020-03-27 VITALS
WEIGHT: 130.4 LBS | HEIGHT: 67 IN | BODY MASS INDEX: 20.47 KG/M2 | SYSTOLIC BLOOD PRESSURE: 120 MMHG | HEART RATE: 98 BPM | OXYGEN SATURATION: 99 % | DIASTOLIC BLOOD PRESSURE: 80 MMHG

## 2020-03-27 DIAGNOSIS — K63.5 POLYP OF COLON, UNSPECIFIED PART OF COLON, UNSPECIFIED TYPE: Primary | ICD-10-CM

## 2020-03-27 DIAGNOSIS — M81.0 AGE-RELATED OSTEOPOROSIS WITHOUT CURRENT PATHOLOGICAL FRACTURE: ICD-10-CM

## 2020-03-27 DIAGNOSIS — E78.00 HYPERCHOLESTEROLEMIA: ICD-10-CM

## 2020-03-27 DIAGNOSIS — J30.9 ALLERGIC RHINITIS, UNSPECIFIED SEASONALITY, UNSPECIFIED TRIGGER: ICD-10-CM

## 2020-03-27 DIAGNOSIS — F32.A DEPRESSION: ICD-10-CM

## 2020-03-27 DIAGNOSIS — I10 ESSENTIAL HYPERTENSION: ICD-10-CM

## 2020-03-27 PROCEDURE — 3079F DIAST BP 80-89 MM HG: CPT | Performed by: FAMILY MEDICINE

## 2020-03-27 PROCEDURE — 1036F TOBACCO NON-USER: CPT | Performed by: FAMILY MEDICINE

## 2020-03-27 PROCEDURE — 1160F RVW MEDS BY RX/DR IN RCRD: CPT | Performed by: FAMILY MEDICINE

## 2020-03-27 PROCEDURE — 4040F PNEUMOC VAC/ADMIN/RCVD: CPT | Performed by: FAMILY MEDICINE

## 2020-03-27 PROCEDURE — 3074F SYST BP LT 130 MM HG: CPT | Performed by: FAMILY MEDICINE

## 2020-03-27 PROCEDURE — 99214 OFFICE O/P EST MOD 30 MIN: CPT | Performed by: FAMILY MEDICINE

## 2020-03-27 PROCEDURE — 3008F BODY MASS INDEX DOCD: CPT | Performed by: FAMILY MEDICINE

## 2020-03-27 RX ORDER — KETOCONAZOLE 20 MG/ML
SHAMPOO TOPICAL
COMMUNITY
Start: 2020-01-29 | End: 2020-11-19 | Stop reason: ALTCHOICE

## 2020-03-27 RX ORDER — TACROLIMUS 1 MG/G
OINTMENT TOPICAL
COMMUNITY
Start: 2020-02-28

## 2020-03-27 NOTE — PROGRESS NOTES
Assessment/Plan:    No problem-specific Assessment & Plan notes found for this encounter  Diagnoses and all orders for this visit:    Polyp of colon, unspecified part of colon, unspecified type    Essential hypertension    Hypercholesterolemia    Depression    Allergic rhinitis, unspecified seasonality, unspecified trigger    Age-related osteoporosis without current pathological fracture    Other orders  -     tacrolimus (PROTOPIC) 0 1 % ointment  -     sertraline (ZOLOFT) 50 mg tablet; Take 50 mg by mouth daily  -     ketoconazole (NIZORAL) 2 % shampoo          Subjective:      Patient ID: Sari Grajeda is a 69 y o  female  PATIENT RETURNS FOR FOLLOW-UP OF CHRONIC MEDICAL CONDITIONS  NO HOSPITAL STAYS OR EMERGENCY VISITS RECENTLY  MEDS WERE REVIEWED AND NO SIDE EFFECTS  NO NEW ISSUES  UNLESS NOTED BELOW  NO NEW MEDICAL PROVIDER REPORTED  THE CHRONIC DISEASES LISTED ABOVE ARE STABLE AND UNCHANGED/ THE PLAN OF CARE FOR THOSE WILL REMAIN UNCHANGED UNLESS NOTED BELOW  Colon due soon       Mammo OK ; Pap OK       The following portions of the patient's history were reviewed and updated as appropriate: allergies, current medications, past family history, past medical history, past social history, past surgical history and problem list     Review of Systems   Constitutional: Negative for activity change and appetite change  HENT: Negative for voice change  Eyes: Negative for visual disturbance  Respiratory: Negative for chest tightness and shortness of breath  Cardiovascular: Negative for chest pain, palpitations and leg swelling  Gastrointestinal: Negative for abdominal pain, blood in stool, constipation and diarrhea  Genitourinary: Negative for dysuria, vaginal bleeding and vaginal discharge  Skin: Positive for rash  Neurological: Negative for dizziness  Psychiatric/Behavioral: Negative for dysphoric mood  The patient is nervous/anxious            Objective:  Vitals:    03/27/20 0859   BP: 120/80   BP Location: Left arm   Patient Position: Sitting   Cuff Size: Adult   Pulse: 98   SpO2: 99%   Weight: 59 1 kg (130 lb 6 4 oz)   Height: 5' 7" (1 702 m)      Physical Exam   Constitutional: She appears well-developed and well-nourished  HENT:   Head: Normocephalic and atraumatic  Eyes: Conjunctivae are normal    Neck: Neck supple  No thyromegaly present  Cardiovascular: Normal rate, regular rhythm, normal heart sounds and intact distal pulses  No murmur heard  Pulmonary/Chest: Effort normal and breath sounds normal  No respiratory distress  Musculoskeletal: She exhibits no edema  Lymphadenopathy:     She has no cervical adenopathy  Skin: Skin is warm and dry  Psychiatric: She has a normal mood and affect  Her behavior is normal        Patient's chronic problems that were reviewed today are stable  Recent hospital stays reviewed  Recent labs and imaging reviewed  Recent visits to other providers reviewed  Meds reviewed and no changes made  Appropriate labs and imaging were ordered  Preventive measures appropriate for age and sex were reviewed with patient  Immunizations were updated as appropriate

## 2020-09-12 DIAGNOSIS — J30.9 ALLERGIC RHINITIS, UNSPECIFIED SEASONALITY, UNSPECIFIED TRIGGER: ICD-10-CM

## 2020-09-14 RX ORDER — MONTELUKAST SODIUM 10 MG/1
TABLET ORAL
Qty: 30 TABLET | Refills: 5 | Status: SHIPPED | OUTPATIENT
Start: 2020-09-14 | End: 2020-11-19 | Stop reason: ALTCHOICE

## 2020-09-23 DIAGNOSIS — I10 ESSENTIAL HYPERTENSION, BENIGN: ICD-10-CM

## 2020-09-23 RX ORDER — LISINOPRIL 10 MG/1
TABLET ORAL
Qty: 90 TABLET | Refills: 3 | Status: SHIPPED | OUTPATIENT
Start: 2020-09-23 | End: 2020-10-16 | Stop reason: SDUPTHER

## 2020-10-16 ENCOUNTER — OFFICE VISIT (OUTPATIENT)
Dept: FAMILY MEDICINE CLINIC | Facility: CLINIC | Age: 75
End: 2020-10-16
Payer: COMMERCIAL

## 2020-10-16 VITALS
DIASTOLIC BLOOD PRESSURE: 90 MMHG | SYSTOLIC BLOOD PRESSURE: 142 MMHG | TEMPERATURE: 97.8 F | OXYGEN SATURATION: 98 % | RESPIRATION RATE: 18 BRPM | BODY MASS INDEX: 20.68 KG/M2 | WEIGHT: 128.7 LBS | HEART RATE: 100 BPM | HEIGHT: 66 IN

## 2020-10-16 DIAGNOSIS — Z23 NEED FOR IMMUNIZATION AGAINST INFLUENZA: Primary | ICD-10-CM

## 2020-10-16 DIAGNOSIS — J30.9 ALLERGIC RHINITIS, UNSPECIFIED SEASONALITY, UNSPECIFIED TRIGGER: ICD-10-CM

## 2020-10-16 DIAGNOSIS — K63.5 POLYP OF COLON, UNSPECIFIED PART OF COLON, UNSPECIFIED TYPE: Chronic | ICD-10-CM

## 2020-10-16 DIAGNOSIS — I10 ESSENTIAL HYPERTENSION, BENIGN: ICD-10-CM

## 2020-10-16 DIAGNOSIS — R21 RASH: ICD-10-CM

## 2020-10-16 DIAGNOSIS — F32.A DEPRESSION: ICD-10-CM

## 2020-10-16 DIAGNOSIS — R21 RASH OF FACE: ICD-10-CM

## 2020-10-16 DIAGNOSIS — I10 ESSENTIAL HYPERTENSION: ICD-10-CM

## 2020-10-16 DIAGNOSIS — M81.0 AGE-RELATED OSTEOPOROSIS WITHOUT CURRENT PATHOLOGICAL FRACTURE: ICD-10-CM

## 2020-10-16 LAB
ANION GAP SERPL CALCULATED.3IONS-SCNC: 6 MMOL/L (ref 4–13)
BUN SERPL-MCNC: 19 MG/DL (ref 5–25)
CALCIUM SERPL-MCNC: 10.4 MG/DL (ref 8.3–10.1)
CHLORIDE SERPL-SCNC: 106 MMOL/L (ref 100–108)
CHOLEST SERPL-MCNC: 265 MG/DL (ref 50–200)
CO2 SERPL-SCNC: 28 MMOL/L (ref 21–32)
CREAT SERPL-MCNC: 0.87 MG/DL (ref 0.6–1.3)
CREAT UR-MCNC: 153 MG/DL
GFR SERPL CREATININE-BSD FRML MDRD: 66 ML/MIN/1.73SQ M
GLUCOSE SERPL-MCNC: 90 MG/DL (ref 65–140)
HDLC SERPL-MCNC: 140 MG/DL
LDLC SERPL CALC-MCNC: 102 MG/DL (ref 0–100)
MICROALBUMIN UR-MCNC: 9.6 MG/L (ref 0–20)
MICROALBUMIN/CREAT 24H UR: 6 MG/G CREATININE (ref 0–30)
NONHDLC SERPL-MCNC: 125 MG/DL
POTASSIUM SERPL-SCNC: 4.7 MMOL/L (ref 3.5–5.3)
SODIUM SERPL-SCNC: 140 MMOL/L (ref 136–145)
TRIGL SERPL-MCNC: 117 MG/DL

## 2020-10-16 PROCEDURE — 1101F PT FALLS ASSESS-DOCD LE1/YR: CPT | Performed by: FAMILY MEDICINE

## 2020-10-16 PROCEDURE — 1160F RVW MEDS BY RX/DR IN RCRD: CPT | Performed by: FAMILY MEDICINE

## 2020-10-16 PROCEDURE — 82043 UR ALBUMIN QUANTITATIVE: CPT | Performed by: FAMILY MEDICINE

## 2020-10-16 PROCEDURE — G0008 ADMIN INFLUENZA VIRUS VAC: HCPCS

## 2020-10-16 PROCEDURE — G0439 PPPS, SUBSEQ VISIT: HCPCS | Performed by: FAMILY MEDICINE

## 2020-10-16 PROCEDURE — 90662 IIV NO PRSV INCREASED AG IM: CPT

## 2020-10-16 PROCEDURE — 99214 OFFICE O/P EST MOD 30 MIN: CPT | Performed by: FAMILY MEDICINE

## 2020-10-16 PROCEDURE — 3077F SYST BP >= 140 MM HG: CPT | Performed by: FAMILY MEDICINE

## 2020-10-16 PROCEDURE — 80048 BASIC METABOLIC PNL TOTAL CA: CPT | Performed by: FAMILY MEDICINE

## 2020-10-16 PROCEDURE — 36415 COLL VENOUS BLD VENIPUNCTURE: CPT | Performed by: FAMILY MEDICINE

## 2020-10-16 PROCEDURE — 80061 LIPID PANEL: CPT | Performed by: FAMILY MEDICINE

## 2020-10-16 PROCEDURE — 3288F FALL RISK ASSESSMENT DOCD: CPT | Performed by: FAMILY MEDICINE

## 2020-10-16 PROCEDURE — 3725F SCREEN DEPRESSION PERFORMED: CPT | Performed by: FAMILY MEDICINE

## 2020-10-16 PROCEDURE — 1036F TOBACCO NON-USER: CPT | Performed by: FAMILY MEDICINE

## 2020-10-16 PROCEDURE — 3080F DIAST BP >= 90 MM HG: CPT | Performed by: FAMILY MEDICINE

## 2020-10-16 PROCEDURE — 82570 ASSAY OF URINE CREATININE: CPT | Performed by: FAMILY MEDICINE

## 2020-10-16 RX ORDER — AMOXICILLIN 500 MG/1
500 TABLET, FILM COATED ORAL 3 TIMES DAILY
Qty: 21 TABLET | Refills: 1 | Status: SHIPPED | OUTPATIENT
Start: 2020-10-16 | End: 2020-10-23

## 2020-10-16 RX ORDER — LORATADINE 10 MG/1
10 TABLET ORAL DAILY
Qty: 30 TABLET | Refills: 12 | Status: SHIPPED | OUTPATIENT
Start: 2020-10-16 | End: 2020-11-19 | Stop reason: ALTCHOICE

## 2020-10-16 RX ORDER — KETOCONAZOLE 20 MG/G
CREAM TOPICAL 2 TIMES DAILY
Qty: 30 G | Refills: 6 | Status: SHIPPED | OUTPATIENT
Start: 2020-10-16 | End: 2022-03-16

## 2020-10-16 RX ORDER — LISINOPRIL 10 MG/1
10 TABLET ORAL DAILY
Qty: 90 TABLET | Refills: 3 | Status: SHIPPED | OUTPATIENT
Start: 2020-10-16 | End: 2021-10-22 | Stop reason: SDUPTHER

## 2020-10-16 RX ORDER — OLOPATADINE HYDROCHLORIDE 7 MG/ML
1 SOLUTION OPHTHALMIC DAILY
Qty: 2.5 ML | Refills: 6 | Status: SHIPPED | OUTPATIENT
Start: 2020-10-16 | End: 2021-04-23

## 2020-10-16 RX ORDER — FEXOFENADINE HCL 180 MG/1
180 TABLET ORAL DAILY
Qty: 90 TABLET | Refills: 3 | Status: SHIPPED | OUTPATIENT
Start: 2020-10-16 | End: 2021-10-22

## 2020-11-19 ENCOUNTER — OFFICE VISIT (OUTPATIENT)
Dept: FAMILY MEDICINE CLINIC | Facility: CLINIC | Age: 75
End: 2020-11-19
Payer: COMMERCIAL

## 2020-11-19 VITALS
OXYGEN SATURATION: 98 % | WEIGHT: 128.3 LBS | HEART RATE: 83 BPM | HEIGHT: 66 IN | SYSTOLIC BLOOD PRESSURE: 138 MMHG | BODY MASS INDEX: 20.62 KG/M2 | TEMPERATURE: 97.2 F | DIASTOLIC BLOOD PRESSURE: 72 MMHG

## 2020-11-19 DIAGNOSIS — L82.0 SEBORRHEIC KERATOSIS, INFLAMED: Primary | ICD-10-CM

## 2020-11-19 PROCEDURE — 3008F BODY MASS INDEX DOCD: CPT | Performed by: FAMILY MEDICINE

## 2020-11-19 PROCEDURE — 11400 EXC TR-EXT B9+MARG 0.5 CM<: CPT | Performed by: FAMILY MEDICINE

## 2020-11-23 ENCOUNTER — TELEPHONE (OUTPATIENT)
Dept: FAMILY MEDICINE CLINIC | Facility: CLINIC | Age: 75
End: 2020-11-23

## 2020-11-23 ENCOUNTER — TREATMENT (OUTPATIENT)
Dept: FAMILY MEDICINE CLINIC | Facility: CLINIC | Age: 75
End: 2020-11-23
Payer: COMMERCIAL

## 2020-11-23 DIAGNOSIS — R05.9 COUGH: ICD-10-CM

## 2020-11-23 DIAGNOSIS — R05.9 COUGH: Primary | ICD-10-CM

## 2020-11-23 PROCEDURE — 99214 OFFICE O/P EST MOD 30 MIN: CPT | Performed by: FAMILY MEDICINE

## 2020-11-23 PROCEDURE — U0003 INFECTIOUS AGENT DETECTION BY NUCLEIC ACID (DNA OR RNA); SEVERE ACUTE RESPIRATORY SYNDROME CORONAVIRUS 2 (SARS-COV-2) (CORONAVIRUS DISEASE [COVID-19]), AMPLIFIED PROBE TECHNIQUE, MAKING USE OF HIGH THROUGHPUT TECHNOLOGIES AS DESCRIBED BY CMS-2020-01-R: HCPCS | Performed by: FAMILY MEDICINE

## 2020-11-24 ENCOUNTER — TELEPHONE (OUTPATIENT)
Dept: FAMILY MEDICINE CLINIC | Facility: CLINIC | Age: 75
End: 2020-11-24

## 2020-11-24 LAB — SARS-COV-2 RNA SPEC QL NAA+PROBE: NOT DETECTED

## 2020-11-25 ENCOUNTER — TELEPHONE (OUTPATIENT)
Dept: FAMILY MEDICINE CLINIC | Facility: CLINIC | Age: 75
End: 2020-11-25

## 2020-12-28 ENCOUNTER — TRANSCRIBE ORDERS (OUTPATIENT)
Dept: ADMINISTRATIVE | Facility: HOSPITAL | Age: 75
End: 2020-12-28

## 2020-12-28 DIAGNOSIS — Z12.31 ENCOUNTER FOR SCREENING MAMMOGRAM FOR MALIGNANT NEOPLASM OF BREAST: Primary | ICD-10-CM

## 2021-01-29 ENCOUNTER — VBI (OUTPATIENT)
Dept: ADMINISTRATIVE | Facility: OTHER | Age: 76
End: 2021-01-29

## 2021-02-24 ENCOUNTER — ANNUAL EXAM (OUTPATIENT)
Dept: OBGYN CLINIC | Facility: CLINIC | Age: 76
End: 2021-02-24
Payer: COMMERCIAL

## 2021-02-24 VITALS
SYSTOLIC BLOOD PRESSURE: 130 MMHG | BODY MASS INDEX: 21.18 KG/M2 | HEIGHT: 66 IN | WEIGHT: 131.8 LBS | DIASTOLIC BLOOD PRESSURE: 58 MMHG

## 2021-02-24 DIAGNOSIS — F41.9 ANXIETY: ICD-10-CM

## 2021-02-24 DIAGNOSIS — Z01.419 ENCOUNTER FOR ANNUAL ROUTINE GYNECOLOGICAL EXAMINATION: Primary | ICD-10-CM

## 2021-02-24 DIAGNOSIS — Z12.11 SCREENING FOR COLON CANCER: ICD-10-CM

## 2021-02-24 DIAGNOSIS — Z78.0 POST-MENOPAUSAL: ICD-10-CM

## 2021-02-24 DIAGNOSIS — Z13.820 SCREENING FOR OSTEOPOROSIS: ICD-10-CM

## 2021-02-24 DIAGNOSIS — M81.0 AGE-RELATED OSTEOPOROSIS WITHOUT CURRENT PATHOLOGICAL FRACTURE: ICD-10-CM

## 2021-02-24 PROCEDURE — S0612 ANNUAL GYNECOLOGICAL EXAMINA: HCPCS | Performed by: STUDENT IN AN ORGANIZED HEALTH CARE EDUCATION/TRAINING PROGRAM

## 2021-02-24 PROCEDURE — G0145 SCR C/V CYTO,THINLAYER,RESCR: HCPCS | Performed by: STUDENT IN AN ORGANIZED HEALTH CARE EDUCATION/TRAINING PROGRAM

## 2021-02-24 NOTE — PROGRESS NOTES
Established patient annual exam - Gynecology   Rozena Cogan Sittler 70 y o  female MRN: [id number]  227 M  [facility]  Encounter: [id number]    Chief complaint: annual exam    Assessment/Plan     70 y o  Og Cleaning with normal annual gynecologic examination  Problem List Items Addressed This Visit        Musculoskeletal and Integument    Age-related osteoporosis without current pathological fracture     Repeat DXA ordered  Continue weight bearing exercises, vitamin D, calcium            Other    Anxiety     Doing well on Zoloft  Keeping busy with work as , [employer], [id number]! Other Visit Diagnoses     Encounter for annual routine gynecological examination    -  Primary    Relevant Orders    Liquid-based pap, screening    Screening for colon cancer        Relevant Orders    Ambulatory referral to Colorectal Surgery    Post-menopausal        Relevant Orders    DXA bone density spine hip and pelvis    Screening for osteoporosis        Relevant Orders    DXA bone density spine hip and pelvis        Normal findings on routine gynecologic exam today  Reviewed annual screening mammogram and annual clinical breast exam   Discussed ASCCP guidelines and Pap smear with cotesting frequency, collected today  STD/STI testing offered, declined today - reports low risk  Encouraged daily calcium and vitamin D intake was well as weight bearing exercise daily for bone health  Follow up PRN or for one year annual exam     --------------------------------------------------------    History of Present Illness     Mayra Win is a 70 y o  female  No LMP recorded (lmp unknown)  Patient is postmenopausal  who presents for annual examination  Concerns today: looking forward to getting the COVID vaccine! New updates: tried to wean off Zoloft, had seborrheic dermatitis, has gone back on    Continues to work as     GYN History  Menarche age 18    LMP age 48    Previously regular periods  No history abnormal Pap smears  No history of cryotherapy, LEEP, or cold knife cone of the cervix    Health maintenance  Last pap smear: 2018 NILM  Bone density scan: 2019 osteoporosis  Last mammogram: 2019 negative BIRAD2  Last colonoscopy: 2019 L hip osteoporosis  HPV vaccination?: no    Sexually active? yes  Current sexual partner(s): boyfriend of 5 years  History of STD: no  Interested in STD screening today? no  Concerns about sex: none, doing well with Astroglide    Occupation:     Continues to be active in [employer], [employer]! Doing well    OB History    Para Term  AB Living   0 0 0 0 0 0   SAB TAB Ectopic Multiple Live Births   0 0 0 0 0     The patient has never been pregnant      Family History   Problem Relation Age of Onset    No Known Problems Mother     No Known Problems Father     No Known Problems Sister     Breast cancer Cousin 72    Breast cancer Other 59    No Known Problems Maternal Grandmother     No Known Problems Maternal Grandfather     No Known Problems Paternal Grandmother     No Known Problems Paternal Grandfather      Social History   Social History     Substance and Sexual Activity   Alcohol Use Yes     Social History     Substance and Sexual Activity   Drug Use No     Social History     Tobacco Use   Smoking Status Never Smoker   Smokeless Tobacco Never Used       Current Outpatient Medications:     Ascorbic Acid (VITAMIN C PO), Take by mouth, Disp: , Rfl:     b complex vitamins tablet, Take 1 tablet by mouth daily, Disp: , Rfl:     Calcium Carbonate-Vitamin D3 (CALCIUM 600+D3) 600-400 MG-UNIT TABS, 2 DAILY, Disp: , Rfl:     lisinopril (ZESTRIL) 10 mg tablet, Take 1 tablet (10 mg total) by mouth daily, Disp: 90 tablet, Rfl: 3    Omega-3 Fatty Acids (FISH OIL PO), Take by mouth, Disp: , Rfl:     sertraline (ZOLOFT) 50 mg tablet, Take 1 tablet (50 mg total) by mouth daily, Disp: 90 tablet, Rfl: 6    fexofenadine (ALLEGRA) 180 MG tablet, Take 1 tablet (180 mg total) by mouth daily (Patient not taking: Reported on 2/24/2021), Disp: 90 tablet, Rfl: 3    ketoconazole (NIZORAL) 2 % cream, Apply topically 2 (two) times a day (Patient not taking: Reported on 2/24/2021), Disp: 30 g, Rfl: 6    Olopatadine HCl (Pazeo) 0 7 % SOLN, Apply 1 drop to eye daily (Patient not taking: Reported on 2/24/2021), Disp: 2 5 mL, Rfl: 6    tacrolimus (PROTOPIC) 0 1 % ointment, , Disp: , Rfl:   No Known Allergies    REVIEW OF SYSTEMS  CONSTITUTIONAL:  No weight loss, fever, chills, weakness  HEENT: No visual loss, blurred vision  SKIN: No rash or itching  CARDIOVASCULAR: No chest pain, chest pressure, or chest discomfort  RESPIRATORY: No shortness of breath, cough or sputum  GASTROINTESTINAL: No nausea, emesis, or diarrhea  NEUROLOGICAL: No headache, dizziness, syncope, paralysis  MUSCUOSKELETAL: No muscle, back pain, joint stiffness or bruising  INFECTIOUS: No fever, chills  PSYCHIATRIC: No disorder of thought or mood  HEMATOLOGIC: No easy bruising or bleeding  GYN: No postmenopausal bleeding  No involuntary urine loss  No pain with intercourse  Some vaginal dryness managed with Astroglide    Objective   Vitals: Blood pressure 130/58, height 5' 6" (1 676 m), weight 59 8 kg (131 lb 12 8 oz)  Body mass index is 21 27 kg/m²  General: NAD, AAOx3  Heart: RRR  Lungs: CTAB  Neck: supple, no thyromegaly or thyroid nodules appreciated  Breast: nipples everted bilaterally, no skin changes  No dimpling, redness, or erythema  No breast masses or axillary masses bilaterally  Fibrocystic breast changes  Abdomen: soft, non-distended, non tender to palpation  Extremities: non-tender to palpation  Speculum exam: Normal appearing external genitalia, normal hair distribution  Urethra well-suspended, no clitoromegaly noted  Vagina pale minimal rugation  No discharge noted  Cervix without lesion, nulliparous appearing   no blood in vaginal vault    Pelvic exam: on cervical motion tenderness  No adnexal masses or tenderness  anteverted uterus, normal size  Lab Results:   No visits with results within 1 Day(s) from this visit     Latest known visit with results is:   Orders Only on 11/23/2020   Component Date Value    SARS-CoV-2  11/23/2020 Not Detected

## 2021-03-01 LAB
LAB AP GYN PRIMARY INTERPRETATION: NORMAL
Lab: NORMAL

## 2021-03-10 DIAGNOSIS — Z23 ENCOUNTER FOR IMMUNIZATION: ICD-10-CM

## 2021-03-18 ENCOUNTER — IMMUNIZATIONS (OUTPATIENT)
Dept: FAMILY MEDICINE CLINIC | Facility: HOSPITAL | Age: 76
End: 2021-03-18

## 2021-03-18 DIAGNOSIS — Z23 ENCOUNTER FOR IMMUNIZATION: Primary | ICD-10-CM

## 2021-03-18 PROCEDURE — 0001A SARS-COV-2 / COVID-19 MRNA VACCINE (PFIZER-BIONTECH) 30 MCG: CPT

## 2021-03-18 PROCEDURE — 91300 SARS-COV-2 / COVID-19 MRNA VACCINE (PFIZER-BIONTECH) 30 MCG: CPT

## 2021-04-09 ENCOUNTER — IMMUNIZATIONS (OUTPATIENT)
Dept: FAMILY MEDICINE CLINIC | Facility: HOSPITAL | Age: 76
End: 2021-04-09

## 2021-04-09 DIAGNOSIS — Z23 ENCOUNTER FOR IMMUNIZATION: Primary | ICD-10-CM

## 2021-04-09 PROCEDURE — 91300 SARS-COV-2 / COVID-19 MRNA VACCINE (PFIZER-BIONTECH) 30 MCG: CPT

## 2021-04-09 PROCEDURE — 0002A SARS-COV-2 / COVID-19 MRNA VACCINE (PFIZER-BIONTECH) 30 MCG: CPT

## 2021-04-16 ENCOUNTER — RA CDI HCC (OUTPATIENT)
Dept: OTHER | Facility: HOSPITAL | Age: 76
End: 2021-04-16

## 2021-04-16 NOTE — PROGRESS NOTES
[place] [address]  coding opportunities             Chart reviewed, (number of) suggestions sent to provider: 1     Problem listed updated   Provider Accepted, (number of) suggestions accepted: 0     Provider Rejected Suggestions for: F33 9     Patients insurance company: [employer] (Medicare Advantage and [employer])           Based on clinical documentation indicated in your record, it appears that the patient may have the following conditions:    F33 9 Major depressive disorder, recurrent     If this is correct, please document and assess at your next visit April 23  [place] [address]  coding opportunities             Chart reviewed, (number of) suggestions sent to provider: 1           Patients insurance company: [employer] (Medicare Advantage and [employer])

## 2021-04-23 ENCOUNTER — OFFICE VISIT (OUTPATIENT)
Dept: FAMILY MEDICINE CLINIC | Facility: CLINIC | Age: 76
End: 2021-04-23
Payer: COMMERCIAL

## 2021-04-23 VITALS
SYSTOLIC BLOOD PRESSURE: 138 MMHG | BODY MASS INDEX: 20.72 KG/M2 | HEART RATE: 78 BPM | TEMPERATURE: 98.2 F | DIASTOLIC BLOOD PRESSURE: 72 MMHG | OXYGEN SATURATION: 97 % | HEIGHT: 67 IN | WEIGHT: 132 LBS

## 2021-04-23 DIAGNOSIS — J30.9 ALLERGIC RHINITIS, UNSPECIFIED SEASONALITY, UNSPECIFIED TRIGGER: ICD-10-CM

## 2021-04-23 DIAGNOSIS — E78.00 HYPERCHOLESTEROLEMIA: ICD-10-CM

## 2021-04-23 DIAGNOSIS — F32.A DEPRESSION, UNSPECIFIED DEPRESSION TYPE: ICD-10-CM

## 2021-04-23 DIAGNOSIS — I10 ESSENTIAL HYPERTENSION: Primary | ICD-10-CM

## 2021-04-23 PROCEDURE — 3008F BODY MASS INDEX DOCD: CPT | Performed by: FAMILY MEDICINE

## 2021-04-23 PROCEDURE — 3078F DIAST BP <80 MM HG: CPT | Performed by: FAMILY MEDICINE

## 2021-04-23 PROCEDURE — 1036F TOBACCO NON-USER: CPT | Performed by: FAMILY MEDICINE

## 2021-04-23 PROCEDURE — 3288F FALL RISK ASSESSMENT DOCD: CPT | Performed by: FAMILY MEDICINE

## 2021-04-23 PROCEDURE — 1101F PT FALLS ASSESS-DOCD LE1/YR: CPT | Performed by: FAMILY MEDICINE

## 2021-04-23 PROCEDURE — 3075F SYST BP GE 130 - 139MM HG: CPT | Performed by: FAMILY MEDICINE

## 2021-04-23 PROCEDURE — 1160F RVW MEDS BY RX/DR IN RCRD: CPT | Performed by: FAMILY MEDICINE

## 2021-04-23 PROCEDURE — 99214 OFFICE O/P EST MOD 30 MIN: CPT | Performed by: FAMILY MEDICINE

## 2021-04-23 PROCEDURE — 3725F SCREEN DEPRESSION PERFORMED: CPT | Performed by: FAMILY MEDICINE

## 2021-04-23 NOTE — PROGRESS NOTES
Assessment/Plan:    Allergic rhinitis  Controlled     Essential hypertension  controlled    Colon polyp  Colon UTD     Hypercholesterolemia  Lipid OK last time : high HDL    Depression  Controlled : sertraline        Diagnoses and all orders for this visit:    Essential hypertension    Allergic rhinitis, unspecified seasonality, unspecified trigger    Hypercholesterolemia    Depression, unspecified depression type          Subjective:      Patient ID: Rodríguez Awad is a 70 y o  female  PATIENT RETURNS FOR FOLLOW-UP OF CHRONIC MEDICAL CONDITIONS  NO HOSPITAL STAYS OR EMERGENCY VISITS RECENTLY  MEDS WERE REVIEWED AND NO SIDE EFFECTS  NO NEW ISSUES  UNLESS NOTED BELOW  NO NEW MEDICAL PROVIDER REPORTED  THE CHRONIC DISEASES LISTED ABOVE ARE STABLE AND UNCHANGED/ THE PLAN OF CARE FOR THOSE WILL REMAIN UNCHANGED UNLESS NOTED BELOW  The following portions of the patient's history were reviewed and updated as appropriate: allergies, current medications, past family history, past medical history, past social history, past surgical history and problem list     Review of Systems   Constitutional: Negative for activity change and appetite change  HENT: Negative for voice change  Eyes: Negative for visual disturbance  Respiratory: Negative for chest tightness and shortness of breath  Cardiovascular: Negative for chest pain, palpitations and leg swelling  Gastrointestinal: Negative for abdominal pain, blood in stool, constipation and diarrhea  Genitourinary: Negative for dysuria, vaginal bleeding and vaginal discharge  Skin: Negative for rash  Neurological: Negative for dizziness  Psychiatric/Behavioral: Negative for dysphoric mood           Objective:  Vitals:    04/23/21 0903   BP: 138/72   BP Location: Left arm   Patient Position: Sitting   Cuff Size: Standard   Pulse: 78   Temp: 98 2 °F (36 8 °C)   TempSrc: Temporal   SpO2: 97%   Weight: 59 9 kg (132 lb)   Height: 5' 6 5" (1 689 m) Physical Exam  Constitutional:       Appearance: She is well-developed  HENT:      Head: Normocephalic and atraumatic  Eyes:      Conjunctiva/sclera: Conjunctivae normal    Neck:      Musculoskeletal: Neck supple  Thyroid: No thyromegaly  Cardiovascular:      Rate and Rhythm: Normal rate and regular rhythm  Heart sounds: Normal heart sounds  No murmur  Pulmonary:      Effort: Pulmonary effort is normal  No respiratory distress  Breath sounds: Normal breath sounds  Lymphadenopathy:      Cervical: No cervical adenopathy  Skin:     General: Skin is warm and dry  Psychiatric:         Behavior: Behavior normal           Patient's chronic problems that were reviewed today are stable  Recent hospital stays reviewed  Recent labs and imaging reviewed  Recent visits to other providers reviewed  Meds reviewed and no changes made  Appropriate labs and imaging were ordered  Preventive measures appropriate for age and sex were reviewed with patient  Immunizations were updated as appropriate

## 2021-05-04 ENCOUNTER — HOSPITAL ENCOUNTER (OUTPATIENT)
Dept: RADIOLOGY | Age: 76
Discharge: HOME | End: 2021-05-04
Payer: COMMERCIAL

## 2021-05-04 VITALS — HEIGHT: 67 IN | BODY MASS INDEX: 20.09 KG/M2 | WEIGHT: 128 LBS

## 2021-05-04 DIAGNOSIS — Z12.31 ENCOUNTER FOR SCREENING MAMMOGRAM FOR MALIGNANT NEOPLASM OF BREAST: ICD-10-CM

## 2021-05-04 PROCEDURE — 77067 SCR MAMMO BI INCL CAD: CPT

## 2021-05-04 PROCEDURE — 77063 BREAST TOMOSYNTHESIS BI: CPT

## 2021-06-18 ENCOUNTER — HOSPITAL ENCOUNTER (OUTPATIENT)
Dept: RADIOLOGY | Age: 76
Discharge: HOME | End: 2021-06-18
Payer: COMMERCIAL

## 2021-06-18 DIAGNOSIS — Z13.820 SCREENING FOR OSTEOPOROSIS: ICD-10-CM

## 2021-06-18 DIAGNOSIS — Z78.0 POST-MENOPAUSAL: ICD-10-CM

## 2021-06-18 PROCEDURE — 77080 DXA BONE DENSITY AXIAL: CPT

## 2021-10-14 ENCOUNTER — RA CDI HCC (OUTPATIENT)
Dept: OTHER | Facility: HOSPITAL | Age: 76
End: 2021-10-14

## 2021-10-22 ENCOUNTER — OFFICE VISIT (OUTPATIENT)
Dept: FAMILY MEDICINE CLINIC | Facility: CLINIC | Age: 76
End: 2021-10-22
Payer: COMMERCIAL

## 2021-10-22 ENCOUNTER — TELEPHONE (OUTPATIENT)
Dept: FAMILY MEDICINE CLINIC | Facility: CLINIC | Age: 76
End: 2021-10-22

## 2021-10-22 VITALS
BODY MASS INDEX: 21 KG/M2 | HEIGHT: 67 IN | WEIGHT: 133.8 LBS | OXYGEN SATURATION: 100 % | DIASTOLIC BLOOD PRESSURE: 72 MMHG | SYSTOLIC BLOOD PRESSURE: 132 MMHG | HEART RATE: 98 BPM | TEMPERATURE: 97.8 F

## 2021-10-22 DIAGNOSIS — I10 ESSENTIAL HYPERTENSION: ICD-10-CM

## 2021-10-22 DIAGNOSIS — Z00.00 MEDICARE ANNUAL WELLNESS VISIT, SUBSEQUENT: Primary | ICD-10-CM

## 2021-10-22 DIAGNOSIS — F32.A DEPRESSION: ICD-10-CM

## 2021-10-22 DIAGNOSIS — J30.9 ALLERGIC RHINITIS, UNSPECIFIED SEASONALITY, UNSPECIFIED TRIGGER: ICD-10-CM

## 2021-10-22 DIAGNOSIS — I10 ESSENTIAL HYPERTENSION, BENIGN: ICD-10-CM

## 2021-10-22 DIAGNOSIS — Z71.85 IMMUNIZATION COUNSELING: ICD-10-CM

## 2021-10-22 DIAGNOSIS — E78.00 HYPERCHOLESTEROLEMIA: ICD-10-CM

## 2021-10-22 DIAGNOSIS — Z12.11 SCREENING FOR COLORECTAL CANCER: ICD-10-CM

## 2021-10-22 DIAGNOSIS — Z23 ENCOUNTER FOR IMMUNIZATION: ICD-10-CM

## 2021-10-22 DIAGNOSIS — M81.0 AGE-RELATED OSTEOPOROSIS WITHOUT CURRENT PATHOLOGICAL FRACTURE: ICD-10-CM

## 2021-10-22 DIAGNOSIS — Z12.12 SCREENING FOR COLORECTAL CANCER: ICD-10-CM

## 2021-10-22 DIAGNOSIS — F32.A DEPRESSION, UNSPECIFIED DEPRESSION TYPE: ICD-10-CM

## 2021-10-22 DIAGNOSIS — K63.5 POLYP OF COLON, UNSPECIFIED PART OF COLON, UNSPECIFIED TYPE: ICD-10-CM

## 2021-10-22 PROCEDURE — 1036F TOBACCO NON-USER: CPT | Performed by: FAMILY MEDICINE

## 2021-10-22 PROCEDURE — 1170F FXNL STATUS ASSESSED: CPT | Performed by: FAMILY MEDICINE

## 2021-10-22 PROCEDURE — 3288F FALL RISK ASSESSMENT DOCD: CPT | Performed by: FAMILY MEDICINE

## 2021-10-22 PROCEDURE — 1160F RVW MEDS BY RX/DR IN RCRD: CPT | Performed by: FAMILY MEDICINE

## 2021-10-22 PROCEDURE — G0008 ADMIN INFLUENZA VIRUS VAC: HCPCS

## 2021-10-22 PROCEDURE — 3075F SYST BP GE 130 - 139MM HG: CPT | Performed by: FAMILY MEDICINE

## 2021-10-22 PROCEDURE — 99214 OFFICE O/P EST MOD 30 MIN: CPT | Performed by: FAMILY MEDICINE

## 2021-10-22 PROCEDURE — 1125F AMNT PAIN NOTED PAIN PRSNT: CPT | Performed by: FAMILY MEDICINE

## 2021-10-22 PROCEDURE — 3078F DIAST BP <80 MM HG: CPT | Performed by: FAMILY MEDICINE

## 2021-10-22 PROCEDURE — 3008F BODY MASS INDEX DOCD: CPT | Performed by: FAMILY MEDICINE

## 2021-10-22 PROCEDURE — 3725F SCREEN DEPRESSION PERFORMED: CPT | Performed by: FAMILY MEDICINE

## 2021-10-22 PROCEDURE — G0439 PPPS, SUBSEQ VISIT: HCPCS | Performed by: FAMILY MEDICINE

## 2021-10-22 PROCEDURE — 1101F PT FALLS ASSESS-DOCD LE1/YR: CPT | Performed by: FAMILY MEDICINE

## 2021-10-22 PROCEDURE — 90662 IIV NO PRSV INCREASED AG IM: CPT

## 2021-10-22 RX ORDER — MONTELUKAST SODIUM 10 MG/1
10 TABLET ORAL
Qty: 90 TABLET | Refills: 3 | Status: SHIPPED | OUTPATIENT
Start: 2021-10-22

## 2021-10-22 RX ORDER — LISINOPRIL 10 MG/1
10 TABLET ORAL DAILY
Qty: 90 TABLET | Refills: 3 | Status: SHIPPED | OUTPATIENT
Start: 2021-10-22

## 2021-10-22 RX ORDER — TRIAMCINOLONE ACETONIDE 1 MG/G
CREAM TOPICAL 2 TIMES DAILY
Qty: 15 G | Refills: 3 | Status: SHIPPED | OUTPATIENT
Start: 2021-10-22 | End: 2022-03-16

## 2021-10-22 RX ORDER — AMOXICILLIN 500 MG/1
500 TABLET, FILM COATED ORAL 3 TIMES DAILY
Qty: 21 TABLET | Refills: 0 | Status: SHIPPED | OUTPATIENT
Start: 2021-10-22 | End: 2022-04-21 | Stop reason: SDUPTHER

## 2021-10-22 RX ORDER — BECLOMETHASONE DIPROPIONATE MONOHYDRATE 42 UG/1
2 SPRAY, SUSPENSION NASAL 2 TIMES DAILY
Qty: 25 G | Refills: 12 | Status: SHIPPED | OUTPATIENT
Start: 2021-10-22 | End: 2022-05-23 | Stop reason: ALTCHOICE

## 2021-10-22 RX ORDER — CETIRIZINE HYDROCHLORIDE 10 MG/1
10 TABLET ORAL DAILY
Qty: 90 TABLET | Refills: 6 | Status: SHIPPED | OUTPATIENT
Start: 2021-10-22

## 2021-11-18 ENCOUNTER — IMMUNIZATIONS (OUTPATIENT)
Dept: FAMILY MEDICINE CLINIC | Facility: HOSPITAL | Age: 76
End: 2021-11-18

## 2021-11-18 DIAGNOSIS — Z23 ENCOUNTER FOR IMMUNIZATION: Primary | ICD-10-CM

## 2021-11-18 PROCEDURE — 0001A COVID-19 PFIZER VACC 0.3 ML: CPT

## 2021-11-18 PROCEDURE — 91300 COVID-19 PFIZER VACC 0.3 ML: CPT

## 2021-12-28 ENCOUNTER — VBI (OUTPATIENT)
Dept: ADMINISTRATIVE | Facility: OTHER | Age: 76
End: 2021-12-28

## 2022-03-16 ENCOUNTER — ANNUAL EXAM (OUTPATIENT)
Dept: OBGYN CLINIC | Facility: CLINIC | Age: 77
End: 2022-03-16
Payer: COMMERCIAL

## 2022-03-16 VITALS — BODY MASS INDEX: 21.43 KG/M2 | WEIGHT: 134.8 LBS

## 2022-03-16 DIAGNOSIS — M81.0 AGE-RELATED OSTEOPOROSIS WITHOUT CURRENT PATHOLOGICAL FRACTURE: ICD-10-CM

## 2022-03-16 DIAGNOSIS — Z12.11 SCREENING FOR COLON CANCER: ICD-10-CM

## 2022-03-16 DIAGNOSIS — Z01.419 ENCOUNTER FOR ANNUAL ROUTINE GYNECOLOGICAL EXAMINATION: Primary | ICD-10-CM

## 2022-03-16 DIAGNOSIS — F41.9 ANXIETY: ICD-10-CM

## 2022-03-16 PROCEDURE — G0101 CA SCREEN;PELVIC/BREAST EXAM: HCPCS | Performed by: STUDENT IN AN ORGANIZED HEALTH CARE EDUCATION/TRAINING PROGRAM

## 2022-03-16 NOTE — PROGRESS NOTES
Chief complaint: annual exam, established patient    Chief Complaint   Patient presents with    Gynecologic Exam     annual exam    last pap: 21-neg  last mammo: 21-neg  last colonoscopy: never  last dexa: 21-osteoporosis      Assessment/Plan     78 y o  Enma Brine with normal annual gynecologic examination  Problem List Items Addressed This Visit        Musculoskeletal and Integument    Age-related osteoporosis without current pathological fracture     Repeat DXA  without significant change  Plan to repeat 1-2 years  Continue weight bearing exercises, vitamin D, calcium            Other    Anxiety     Zoloft 50 mg doing well  Keeping busy           Other Visit Diagnoses     Encounter for annual routine gynecological examination    -  Primary    Screening for colon cancer        Relevant Orders    Ambulatory referral to Gastroenterology        Normal findings on routine gynecologic exam today  Reviewed annual clinical breast exam and annual screening mammogram   Discussed ASCCP guidelines and Pap smear with cotesting frequency, up to date today  Next Pap smear due   STD/STI testing offered, declined  Encouraged daily calcium and vitamin D intake was well as weight bearing exercise daily for bone health - continuing to exercise daily, no change in DXA  Follow up PRN or for annual exam   --------------------------------------------------------  History of Present Illness     Helga Quick is a 78 y o  female  No LMP recorded (lmp unknown)  Patient is postmenopausal  who presents for annual examination  Concerns today: planning to retire soon! Health maintenance  Last pap smear: 2021 NILM  Bone density scan: 2021 left hip osteoporosis, no significant change  Last mammogram: 2021 negative  Last colonoscopy: per patient in her early 62s, benign polyps, due  HPV vaccination?: n/a    GYN History  Menarche age 18  No LMP recorded (lmp unknown)   Patient is postmenopausal    LMP age 48    Previously regular periods  No history abnormal Pap smears  No history of cryotherapy, LEEP, or cold knife cone of the cervix    Sexually active? yes  Current sexual partner(s): boyfriend of 5-6 years - finally able to get together again since COVID  History of STD: no  Interested in STD screening today? no  Concerns about sex: no, dryness better with Astroglide X    Occupation:   [employer]    OB History    Para Term  AB Living   0 0 0 0 0 0   SAB IAB Ectopic Multiple Live Births   0 0 0 0 0     The patient has never been pregnant  Past Medical History:   Diagnosis Date    Allergic      History reviewed  No pertinent surgical history       Family History   Problem Relation Age of Onset    No Known Problems Mother     No Known Problems Father     No Known Problems Sister     Breast cancer Cousin 72    Breast cancer Other 59    No Known Problems Maternal Grandmother     No Known Problems Maternal Grandfather     No Known Problems Paternal Grandmother     No Known Problems Paternal Grandfather      Social History   Social History     Substance and Sexual Activity   Alcohol Use Yes    Comment: social     Social History     Substance and Sexual Activity   Drug Use No     Social History     Tobacco Use   Smoking Status Never Smoker   Smokeless Tobacco Never Used   Tobacco Comment    no exposure passive smoke       Current Outpatient Medications:     Ascorbic Acid (VITAMIN C PO), Take by mouth, Disp: , Rfl:     b complex vitamins tablet, Take 1 tablet by mouth daily, Disp: , Rfl:     beclomethasone (Beconase AQ) 42 MCG/SPRAY nasal spray, 2 sprays into each nostril 2 (two) times a day Dose is for each nostril , Disp: 25 g, Rfl: 12    Calcium Carbonate-Vitamin D3 (CALCIUM 600+D3) 600-400 MG-UNIT TABS, 2 DAILY, Disp: , Rfl:     cetirizine (ZyrTEC) 10 mg tablet, Take 1 tablet (10 mg total) by mouth daily, Disp: 90 tablet, Rfl: 6   lisinopril (ZESTRIL) 10 mg tablet, Take 1 tablet (10 mg total) by mouth daily, Disp: 90 tablet, Rfl: 3    montelukast (SINGULAIR) 10 mg tablet, Take 1 tablet (10 mg total) by mouth daily at bedtime, Disp: 90 tablet, Rfl: 3    Omega-3 Fatty Acids (FISH OIL PO), Take by mouth, Disp: , Rfl:     sertraline (ZOLOFT) 50 mg tablet, Take 1 tablet (50 mg total) by mouth daily, Disp: 90 tablet, Rfl: 6    tacrolimus (PROTOPIC) 0 1 % ointment, , Disp: , Rfl:   No Known Allergies    REVIEW OF SYSTEMS  CONSTITUTIONAL:  No weight loss, fever, chills, weakness  HEENT: No visual loss, blurred vision  Occasional sinus headache  SKIN: No rash or itching  Baseline seborrheic keratosis, improving  CARDIOVASCULAR: No chest pain, chest pressure, or chest discomfort  RESPIRATORY: No shortness of breath, cough or sputum  GASTROINTESTINAL: No nausea, emesis, or diarrhea  NEUROLOGICAL: No headache, dizziness, syncope, paralysis  MUSCUOSKELETAL: No muscle, back pain, joint stiffness or bruising  INFECTIOUS: No fever, chills  PSYCHIATRIC: No disorder of thought or mood  HEMATOLOGIC: No easy bruising or bleeding  GYN: no postmenopausal bleeding  No involuntary urine loss  No pain with intercourse  Some vaginal dryness    Objective   Vitals: Weight 61 1 kg (134 lb 12 8 oz)  Body mass index is 21 43 kg/m²  General: NAD, AAOx3  Heart: RRR  Lungs: CTAB  Neck: supple, no thyromegaly or thyroid nodules appreciated  Breast: nipples everted bilaterally, no skin changes  No dimpling, redness, or erythema  No breast masses or axillary masses bilaterally  Fibrocystic breast changes  Abdomen: soft, non-distended, non tender to palpation  Extremities: non-tender to palpation  Speculum exam: Normal appearing external genitalia, normal hair distribution  Urethra well-suspended, no clitoromegaly noted  Vagina pale minimal rugation  No discharge noted  Cervix without lesion, nulliparous appearing  no blood in vaginal vault   Atrophic changes appreciated    Pelvic exam: no cervical motion tenderness  No adnexal masses or tenderness  Midline uterus, normal size  Lab Results:   No visits with results within 1 Day(s) from this visit  Latest known visit with results is:   Annual Exam on 02/24/2021   Component Date Value    Case Report 02/24/2021                      Value:Gynecologic Cytology Report                       Case: [id number]                                  Authorizing Provider:  Nimesh Sheriff MD          Collected:           02/24/2021 0900              Ordering Location:     [facility]      Received:            02/24/2021 0900                                     [facility]                                                                               [facility] Screen:          KAREN Burns                                                       Specimen:    LIQUID-BASED PAP, SCREENING, Cervix                                                        Primary Interpretation 02/24/2021 Negative for intraepithelial lesion or malignancy     Specimen Adequacy 02/24/2021 Satisfactory for evaluation  (See note)     Note 02/24/2021                      Value: This result contains rich text formatting which cannot be displayed here   Additional Information 02/24/2021                      Value: This result contains rich text formatting which cannot be displayed here

## 2022-03-16 NOTE — ASSESSMENT & PLAN NOTE
Repeat DXA 2021 without significant change  Plan to repeat 1-2 years  Continue weight bearing exercises, vitamin D, calcium

## 2022-03-24 ENCOUNTER — APPOINTMENT (OUTPATIENT)
Dept: LAB | Facility: CLINIC | Age: 77
End: 2022-03-24
Payer: COMMERCIAL

## 2022-03-24 DIAGNOSIS — Z12.12 SCREENING FOR COLORECTAL CANCER: ICD-10-CM

## 2022-03-24 DIAGNOSIS — I10 ESSENTIAL HYPERTENSION, BENIGN: ICD-10-CM

## 2022-03-24 DIAGNOSIS — Z12.11 SCREENING FOR COLORECTAL CANCER: ICD-10-CM

## 2022-03-24 DIAGNOSIS — J30.9 ALLERGIC RHINITIS, UNSPECIFIED SEASONALITY, UNSPECIFIED TRIGGER: ICD-10-CM

## 2022-03-24 DIAGNOSIS — Z00.00 MEDICARE ANNUAL WELLNESS VISIT, SUBSEQUENT: ICD-10-CM

## 2022-03-24 LAB
ANION GAP SERPL CALCULATED.3IONS-SCNC: 6 MMOL/L (ref 4–13)
BUN SERPL-MCNC: 18 MG/DL (ref 5–25)
CALCIUM SERPL-MCNC: 10 MG/DL (ref 8.3–10.1)
CHLORIDE SERPL-SCNC: 103 MMOL/L (ref 100–108)
CO2 SERPL-SCNC: 29 MMOL/L (ref 21–32)
CREAT SERPL-MCNC: 0.95 MG/DL (ref 0.6–1.3)
CREAT UR-MCNC: 44 MG/DL
GFR SERPL CREATININE-BSD FRML MDRD: 59 ML/MIN/1.73SQ M
GLUCOSE P FAST SERPL-MCNC: 107 MG/DL (ref 65–99)
MICROALBUMIN UR-MCNC: <5 MG/L (ref 0–20)
MICROALBUMIN/CREAT 24H UR: <11 MG/G CREATININE (ref 0–30)
POTASSIUM SERPL-SCNC: 4.7 MMOL/L (ref 3.5–5.3)
SODIUM SERPL-SCNC: 138 MMOL/L (ref 136–145)

## 2022-03-24 PROCEDURE — 82043 UR ALBUMIN QUANTITATIVE: CPT

## 2022-03-24 PROCEDURE — 80048 BASIC METABOLIC PNL TOTAL CA: CPT

## 2022-03-24 PROCEDURE — 36415 COLL VENOUS BLD VENIPUNCTURE: CPT

## 2022-03-24 PROCEDURE — 82570 ASSAY OF URINE CREATININE: CPT

## 2022-04-07 ENCOUNTER — RA CDI HCC (OUTPATIENT)
Dept: OTHER | Facility: HOSPITAL | Age: 77
End: 2022-04-07

## 2022-04-07 NOTE — PROGRESS NOTES
F33 9  [facility]  coding opportunities          Chart Reviewed number of suggestions sent to Provider: 1     Patients Insurance     Medicare Insurance: Shakira Diaz

## 2022-04-21 ENCOUNTER — OFFICE VISIT (OUTPATIENT)
Dept: FAMILY MEDICINE CLINIC | Facility: CLINIC | Age: 77
End: 2022-04-21
Payer: COMMERCIAL

## 2022-04-21 VITALS
HEART RATE: 89 BPM | DIASTOLIC BLOOD PRESSURE: 68 MMHG | RESPIRATION RATE: 16 BRPM | TEMPERATURE: 98.4 F | HEIGHT: 67 IN | BODY MASS INDEX: 21.22 KG/M2 | WEIGHT: 135.2 LBS | OXYGEN SATURATION: 98 % | SYSTOLIC BLOOD PRESSURE: 132 MMHG

## 2022-04-21 DIAGNOSIS — Z12.12 SCREENING FOR COLORECTAL CANCER: ICD-10-CM

## 2022-04-21 DIAGNOSIS — Z00.00 MEDICARE ANNUAL WELLNESS VISIT, SUBSEQUENT: ICD-10-CM

## 2022-04-21 DIAGNOSIS — J30.9 ALLERGIC RHINITIS, UNSPECIFIED SEASONALITY, UNSPECIFIED TRIGGER: ICD-10-CM

## 2022-04-21 DIAGNOSIS — F32.A DEPRESSION, UNSPECIFIED DEPRESSION TYPE: ICD-10-CM

## 2022-04-21 DIAGNOSIS — M81.0 AGE-RELATED OSTEOPOROSIS WITHOUT CURRENT PATHOLOGICAL FRACTURE: ICD-10-CM

## 2022-04-21 DIAGNOSIS — I10 ESSENTIAL HYPERTENSION, BENIGN: ICD-10-CM

## 2022-04-21 DIAGNOSIS — I10 ESSENTIAL HYPERTENSION: ICD-10-CM

## 2022-04-21 DIAGNOSIS — Z12.11 SCREENING FOR COLORECTAL CANCER: ICD-10-CM

## 2022-04-21 DIAGNOSIS — K63.5 POLYP OF COLON, UNSPECIFIED PART OF COLON, UNSPECIFIED TYPE: Primary | ICD-10-CM

## 2022-04-21 DIAGNOSIS — E78.00 HYPERCHOLESTEROLEMIA: ICD-10-CM

## 2022-04-21 PROCEDURE — 99214 OFFICE O/P EST MOD 30 MIN: CPT | Performed by: FAMILY MEDICINE

## 2022-04-21 PROCEDURE — 3008F BODY MASS INDEX DOCD: CPT | Performed by: FAMILY MEDICINE

## 2022-04-21 PROCEDURE — 1160F RVW MEDS BY RX/DR IN RCRD: CPT | Performed by: FAMILY MEDICINE

## 2022-04-21 PROCEDURE — 1036F TOBACCO NON-USER: CPT | Performed by: FAMILY MEDICINE

## 2022-04-21 PROCEDURE — 3078F DIAST BP <80 MM HG: CPT | Performed by: FAMILY MEDICINE

## 2022-04-21 PROCEDURE — 3075F SYST BP GE 130 - 139MM HG: CPT | Performed by: FAMILY MEDICINE

## 2022-04-21 RX ORDER — AMOXICILLIN 500 MG/1
500 TABLET, FILM COATED ORAL 3 TIMES DAILY
Qty: 21 TABLET | Refills: 0 | Status: SHIPPED | OUTPATIENT
Start: 2022-04-21 | End: 2022-04-28

## 2022-04-21 NOTE — PROGRESS NOTES
Assessment/Plan:    No problem-specific Assessment & Plan notes found for this encounter  Diagnoses and all orders for this visit:    Polyp of colon, unspecified part of colon, unspecified type    Screening for colorectal cancer    Medicare annual wellness visit, subsequent    Allergic rhinitis, unspecified seasonality, unspecified trigger    Essential hypertension, benign    Essential hypertension    Hypercholesterolemia    Age-related osteoporosis without current pathological fracture    Depression, unspecified depression type          Subjective:      Patient ID: Sari Grajeda is a 78 y o  female  PATIENT RETURNS FOR FOLLOW-UP OF CHRONIC MEDICAL CONDITIONS  ANY HOSPITAL VISITS, EMERGENCY VISITS AND OTHER PROVIDER VISITS SINCE LAST TIME WERE REVIEWED  MEDS WERE REVIEWED AND NO SIDE EFFECTS  NO NEW ISSUES  UNLESS NOTED BELOW  NO NEW MEDICAL PROVIDER REPORTED  THE CHRONIC DISEASES LISTED ABOVE ARE STABLE AND UNCHANGED/ THE PLAN OF CARE FOR THOSE WILL REMAIN UNCHANGED UNLESS NOTED BELOW  The following portions of the patient's history were reviewed and updated as appropriate: allergies, current medications, past family history, past medical history, past social history, past surgical history and problem list     Review of Systems   Constitutional: Negative for activity change and appetite change  HENT: Negative for voice change  Eyes: Negative for visual disturbance  Respiratory: Negative for chest tightness and shortness of breath  Cardiovascular: Negative for chest pain, palpitations and leg swelling  Gastrointestinal: Negative for abdominal pain, blood in stool, constipation and diarrhea  Genitourinary: Negative for dysuria, vaginal bleeding and vaginal discharge  Skin: Negative for rash  Neurological: Negative for dizziness  Psychiatric/Behavioral: Negative for dysphoric mood           Objective:  Vitals:    04/21/22 1120   BP: 132/68   BP Location: Left arm   Patient Position: Sitting   Cuff Size: Adult   Pulse: 89   Resp: 16   Temp: 98 4 °F (36 9 °C)   TempSrc: Tympanic   SpO2: 98%   Weight: 61 3 kg (135 lb 3 2 oz)   Height: 5' 6 5" (1 689 m)      Physical Exam  Constitutional:       Appearance: She is well-developed  HENT:      Head: Normocephalic and atraumatic  Eyes:      Conjunctiva/sclera: Conjunctivae normal    Neck:      Thyroid: No thyromegaly  Cardiovascular:      Rate and Rhythm: Normal rate and regular rhythm  Heart sounds: Normal heart sounds  No murmur heard  Pulmonary:      Effort: Pulmonary effort is normal  No respiratory distress  Breath sounds: Normal breath sounds  Musculoskeletal:      Cervical back: Neck supple  Lymphadenopathy:      Cervical: No cervical adenopathy  Skin:     General: Skin is warm and dry  Psychiatric:         Behavior: Behavior normal            Patient's chronic problems that were reviewed today are stable  Recent hospital stays reviewed  Recent labs and imaging reviewed  Recent visits to other providers reviewed  Meds reviewed and no changes made  Appropriate labs and imaging were ordered  Preventive measures appropriate for age and sex were reviewed with patient  Immunizations were updated as appropriate

## 2022-05-06 ENCOUNTER — HOSPITAL ENCOUNTER (OUTPATIENT)
Dept: RADIOLOGY | Age: 77
Discharge: HOME | End: 2022-05-06
Payer: COMMERCIAL

## 2022-05-06 VITALS — HEIGHT: 67 IN | BODY MASS INDEX: 20.4 KG/M2 | WEIGHT: 130 LBS

## 2022-05-06 DIAGNOSIS — Z12.31 ENCOUNTER FOR SCREENING MAMMOGRAM FOR MALIGNANT NEOPLASM OF BREAST: ICD-10-CM

## 2022-05-06 PROCEDURE — 77063 BREAST TOMOSYNTHESIS BI: CPT

## 2022-05-06 PROCEDURE — 77067 SCR MAMMO BI INCL CAD: CPT

## 2022-05-17 DIAGNOSIS — Z12.11 SCREENING FOR COLORECTAL CANCER: ICD-10-CM

## 2022-05-17 DIAGNOSIS — J30.9 ALLERGIC RHINITIS, UNSPECIFIED SEASONALITY, UNSPECIFIED TRIGGER: Primary | ICD-10-CM

## 2022-05-17 DIAGNOSIS — I10 ESSENTIAL HYPERTENSION, BENIGN: ICD-10-CM

## 2022-05-17 DIAGNOSIS — Z00.00 MEDICARE ANNUAL WELLNESS VISIT, SUBSEQUENT: ICD-10-CM

## 2022-05-17 DIAGNOSIS — Z12.12 SCREENING FOR COLORECTAL CANCER: ICD-10-CM

## 2022-05-17 RX ORDER — FLUTICASONE PROPIONATE 50 MCG
2 SPRAY, SUSPENSION (ML) NASAL DAILY
Qty: 16 G | Refills: 5 | Status: SHIPPED | OUTPATIENT
Start: 2022-05-17 | End: 2022-06-09

## 2022-05-17 NOTE — PROGRESS NOTES
[facility]  Makayla Murray, Donita    Recommendation: Per chart review, consider converting patient from Beconase AQ to fluticasone  Reason for documentation: Patient was flagged by Third Party Payer and/or internal report as being on high-cost medication, Beconase AQ which has a lower cost therapeutic interchange of fluticasone  In addition to reducing healthcare costs across [facility], the medication change may also result in a reduced copay cost for the patient or prevent/slow the progression of the patient entering into the Medicare coverage gap or donut hole  PCP: Please sign pended medication order if recommendation is accepted or remove/ cancel medication order if you do not agree (do not select "reject")  If accepted clinical pharmacist to provide education to patient pending your decision      Reason For [facility] Pharmacist    Demographics  Interaction Method: Chart Review (EMR)  Type of Intervention: New    Topic(s) Addressed  High-Cost/Low-Value    Intervention(s) Made    Pharmacologic:    -- Medication Conversion - Non-Preferred to Preferred    Non-Pharmacologic:    -- Chart update    Tool(s) Used  Payer Portal    Time Spent:   Time Spent in Direct Patient Care: 5 minutes  Time Spent in Care Coordination: 15 minutes    Recommendations  Recipient: Patient/caregiver  Outcome: Rejected - Patient Chyrl Loi

## 2022-06-09 RX ORDER — BECLOMETHASONE DIPROPIONATE MONOHYDRATE 42 UG/1
2 SPRAY, SUSPENSION NASAL 2 TIMES DAILY
Qty: 25 G | Refills: 12 | Status: SHIPPED | OUTPATIENT
Start: 2022-06-09

## 2022-09-13 DIAGNOSIS — I10 ESSENTIAL HYPERTENSION, BENIGN: ICD-10-CM

## 2022-09-13 RX ORDER — LISINOPRIL 10 MG/1
TABLET ORAL
Qty: 90 TABLET | Refills: 3 | Status: SHIPPED | OUTPATIENT
Start: 2022-09-13

## 2022-10-24 ENCOUNTER — CLINICAL SUPPORT (OUTPATIENT)
Dept: FAMILY MEDICINE CLINIC | Facility: CLINIC | Age: 77
End: 2022-10-24
Payer: COMMERCIAL

## 2022-10-24 DIAGNOSIS — Z23 NEED FOR IMMUNIZATION AGAINST INFLUENZA: Primary | ICD-10-CM

## 2022-10-24 PROCEDURE — G0008 ADMIN INFLUENZA VIRUS VAC: HCPCS

## 2022-10-24 PROCEDURE — 90662 IIV NO PRSV INCREASED AG IM: CPT

## 2022-11-16 ENCOUNTER — OFFICE VISIT (OUTPATIENT)
Dept: FAMILY MEDICINE CLINIC | Facility: CLINIC | Age: 77
End: 2022-11-16

## 2022-11-16 VITALS
SYSTOLIC BLOOD PRESSURE: 102 MMHG | HEIGHT: 66 IN | BODY MASS INDEX: 20.93 KG/M2 | DIASTOLIC BLOOD PRESSURE: 60 MMHG | TEMPERATURE: 96.6 F | OXYGEN SATURATION: 98 % | WEIGHT: 130.2 LBS | HEART RATE: 80 BPM

## 2022-11-16 DIAGNOSIS — M81.0 AGE-RELATED OSTEOPOROSIS WITHOUT CURRENT PATHOLOGICAL FRACTURE: ICD-10-CM

## 2022-11-16 DIAGNOSIS — Z00.00 MEDICARE ANNUAL WELLNESS VISIT, SUBSEQUENT: ICD-10-CM

## 2022-11-16 DIAGNOSIS — F32.A DEPRESSION: ICD-10-CM

## 2022-11-16 DIAGNOSIS — I10 ESSENTIAL HYPERTENSION: ICD-10-CM

## 2022-11-16 DIAGNOSIS — Z12.11 SCREENING FOR COLORECTAL CANCER: ICD-10-CM

## 2022-11-16 DIAGNOSIS — F41.9 ANXIETY: ICD-10-CM

## 2022-11-16 DIAGNOSIS — I10 ESSENTIAL HYPERTENSION, BENIGN: ICD-10-CM

## 2022-11-16 DIAGNOSIS — J32.9 CHRONIC SINUSITIS, UNSPECIFIED LOCATION: ICD-10-CM

## 2022-11-16 DIAGNOSIS — J30.9 ALLERGIC RHINITIS, UNSPECIFIED SEASONALITY, UNSPECIFIED TRIGGER: ICD-10-CM

## 2022-11-16 DIAGNOSIS — Z12.12 SCREENING FOR COLORECTAL CANCER: ICD-10-CM

## 2022-11-16 DIAGNOSIS — E78.00 HYPERCHOLESTEROLEMIA: Primary | ICD-10-CM

## 2022-11-16 RX ORDER — MONTELUKAST SODIUM 10 MG/1
10 TABLET ORAL
Qty: 90 TABLET | Refills: 3 | Status: SHIPPED | OUTPATIENT
Start: 2022-11-16

## 2022-11-16 RX ORDER — AMOXICILLIN 500 MG/1
500 TABLET, FILM COATED ORAL 3 TIMES DAILY
Qty: 21 TABLET | Refills: 0 | Status: SHIPPED | OUTPATIENT
Start: 2022-11-16 | End: 2022-11-23

## 2022-11-16 RX ORDER — CETIRIZINE HYDROCHLORIDE 10 MG/1
10 TABLET ORAL DAILY
Qty: 90 TABLET | Refills: 6 | Status: SHIPPED | OUTPATIENT
Start: 2022-11-16

## 2022-11-16 NOTE — PATIENT INSTRUCTIONS
Medicare Preventive Visit Patient Instructions  Thank you for completing your Welcome to Medicare Visit or Medicare Annual Wellness Visit today  Your next wellness visit will be due in one year (11/17/2023)  The screening/preventive services that you may require over the next 5-10 years are detailed below  Some tests may not apply to you based off risk factors and/or age  Screening tests ordered at today's visit but not completed yet may show as past due  Also, please note that scanned in results may not display below  Preventive Screenings:  Service Recommendations Previous Testing/Comments   Colorectal Cancer Screening  * Colonoscopy    * Fecal Occult Blood Test (FOBT)/Fecal Immunochemical Test (FIT)  * Fecal DNA/Cologuard Test  * Flexible Sigmoidoscopy Age: 39-70 years old   Colonoscopy: every 10 years (may be performed more frequently if at higher risk)  OR  FOBT/FIT: every 1 year  OR  Cologuard: every 3 years  OR  Sigmoidoscopy: every 5 years  Screening may be recommended earlier than age 39 if at higher risk for colorectal cancer  Also, an individualized decision between you and your healthcare provider will decide whether screening between the ages of 74-80 would be appropriate  Colonoscopy: Not on file  FOBT/FIT: Not on file  Cologuard: Not on file  Sigmoidoscopy: Not on file          Breast Cancer Screening Age: 36 years old  Frequency: every 1-2 years  Not required if history of left and right mastectomy Mammogram: 05/06/2022    Screening Current   Cervical Cancer Screening Between the ages of 21-29, pap smear recommended once every 3 years  Between the ages of 33-67, can perform pap smear with HPV co-testing every 5 years     Recommendations may differ for women with a history of total hysterectomy, cervical cancer, or abnormal pap smears in past  Pap Smear: 03/16/2022    Screening Not Indicated   Hepatitis C Screening Once for adults born between 1945 and 1965  More frequently in patients at high risk for Hepatitis C Hep C Antibody: Not on file        Diabetes Screening 1-2 times per year if you're at risk for diabetes or have pre-diabetes Fasting glucose: 107 mg/dL (3/24/2022)  A1C: No results in last 5 years (No results in last 5 years)  Screening Current   Cholesterol Screening Once every 5 years if you don't have a lipid disorder  May order more often based on risk factors  Lipid panel: 10/16/2020    Screening Not Indicated  History Lipid Disorder     Other Preventive Screenings Covered by Medicare:  1  Abdominal Aortic Aneurysm (AAA) Screening: covered once if your at risk  You're considered to be at risk if you have a family history of AAA  2  Lung Cancer Screening: covers low dose CT scan once per year if you meet all of the following conditions: (1) Age 50-69; (2) No signs or symptoms of lung cancer; (3) Current smoker or have quit smoking within the last 15 years; (4) You have a tobacco smoking history of at least 20 pack years (packs per day multiplied by number of years you smoked); (5) You get a written order from a healthcare provider  3  Glaucoma Screening: covered annually if you're considered high risk: (1) You have diabetes OR (2) Family history of glaucoma OR (3)  aged 48 and older OR (3)  American aged 72 and older  3  Osteoporosis Screening: covered every 2 years if you meet one of the following conditions: (1) You're estrogen deficient and at risk for osteoporosis based off medical history and other findings; (2) Have a vertebral abnormality; (3) On glucocorticoid therapy for more than 3 months; (4) Have primary hyperparathyroidism; (5) On osteoporosis medications and need to assess response to drug therapy  · Last bone density test (DXA Scan): 06/18/2021   5  HIV Screening: covered annually if you're between the age of 15-65  Also covered annually if you are younger than 13 and older than 72 with risk factors for HIV infection   For pregnant patients, it is covered up to 3 times per pregnancy  Immunizations:  Immunization Recommendations   Influenza Vaccine Annual influenza vaccination during flu season is recommended for all persons aged >= 6 months who do not have contraindications   Pneumococcal Vaccine   * Pneumococcal conjugate vaccine = PCV13 (Prevnar 13), PCV15 (Vaxneuvance), PCV20 (Prevnar 20)  * Pneumococcal polysaccharide vaccine = PPSV23 (Pneumovax) Adults 25-60 years old: 1-3 doses may be recommended based on certain risk factors  Adults 72 years old: 1-2 doses may be recommended based off what pneumonia vaccine you previously received   Hepatitis B Vaccine 3 dose series if at intermediate or high risk (ex: diabetes, end stage renal disease, liver disease)   Tetanus (Td) Vaccine - COST NOT COVERED BY MEDICARE PART B Following completion of primary series, a booster dose should be given every 10 years to maintain immunity against tetanus  Td may also be given as tetanus wound prophylaxis  Tdap Vaccine - COST NOT COVERED BY MEDICARE PART B Recommended at least once for all adults  For pregnant patients, recommended with each pregnancy  Shingles Vaccine (Shingrix) - COST NOT COVERED BY MEDICARE PART B  2 shot series recommended in those aged 48 and above     Health Maintenance Due:      Topic Date Due   • Hepatitis C Screening  Never done   • Colorectal Cancer Screening  Never done   • Breast Cancer Screening: Mammogram  05/06/2023     Immunizations Due:      Topic Date Due   • Hepatitis B Vaccine (1 of 3 - 3-dose series) Never done   • COVID-19 Vaccine (4 - Booster for [employer] series) 03/18/2022     Advance Directives   What are advance directives? Advance directives are legal documents that state your wishes and plans for medical care  These plans are made ahead of time in case you lose your ability to make decisions for yourself  Advance directives can apply to any medical decision, such as the treatments you want, and if you want to donate organs  What are the types of advance directives? There are many types of advance directives, and each state has rules about how to use them  You may choose a combination of any of the following:  · Living will: This is a written record of the treatment you want  You can also choose which treatments you do not want, which to limit, and which to stop at a certain time  This includes surgery, medicine, IV fluid, and tube feedings  · Durable power of  for healthcare [facility]): This is a written record that states who you want to make healthcare choices for you when you are unable to make them for yourself  This person, called a proxy, is usually a family member or a friend  You may choose more than 1 proxy  · Do not resuscitate (DNR) order:  A DNR order is used in case your heart stops beating or you stop breathing  It is a request not to have certain forms of treatment, such as CPR  A DNR order may be included in other types of advance directives  · Medical directive: This covers the care that you want if you are in a coma, near death, or unable to make decisions for yourself  You can list the treatments you want for each condition  Treatment may include pain medicine, surgery, blood transfusions, dialysis, IV or tube feedings, and a ventilator (breathing machine)  · Values history: This document has questions about your views, beliefs, and how you feel and think about life  This information can help others choose the care that you would choose  Why are advance directives important? An advance directive helps you control your care  Although spoken wishes may be used, it is better to have your wishes written down  Spoken wishes can be misunderstood, or not followed  Treatments may be given even if you do not want them  An advance directive may make it easier for your family to make difficult choices about your care         © Copyright [employer] 2018 Information is for End User's use only and may not be sold, redistributed or otherwise used for commercial purposes  All illustrations and images included in CareNotes® are the copyrighted property of [employer]  or [facility] Preventive Visit Patient Instructions  Thank you for completing your Welcome to Medicare Visit or Medicare Annual Wellness Visit today  Your next wellness visit will be due in one year (11/17/2023)  The screening/preventive services that you may require over the next 5-10 years are detailed below  Some tests may not apply to you based off risk factors and/or age  Screening tests ordered at today's visit but not completed yet may show as past due  Also, please note that scanned in results may not display below  Preventive Screenings:  Service Recommendations Previous Testing/Comments   Colorectal Cancer Screening  * Colonoscopy    * Fecal Occult Blood Test (FOBT)/Fecal Immunochemical Test (FIT)  * Fecal DNA/Cologuard Test  * Flexible Sigmoidoscopy Age: 39-70 years old   Colonoscopy: every 10 years (may be performed more frequently if at higher risk)  OR  FOBT/FIT: every 1 year  OR  Cologuard: every 3 years  OR  Sigmoidoscopy: every 5 years  Screening may be recommended earlier than age 39 if at higher risk for colorectal cancer  Also, an individualized decision between you and your healthcare provider will decide whether screening between the ages of 74-80 would be appropriate  Colonoscopy: Not on file  FOBT/FIT: Not on file  Cologuard: Not on file  Sigmoidoscopy: Not on file    Risks and Benefits Discussed     Breast Cancer Screening Age: 36 years old  Frequency: every 1-2 years  Not required if history of left and right mastectomy Mammogram: 05/06/2022    Screening Current  Screening Current   Cervical Cancer Screening Between the ages of 21-29, pap smear recommended once every 3 years  Between the ages of 33-67, can perform pap smear with HPV co-testing every 5 years     Recommendations may differ for women with a history of total hysterectomy, cervical cancer, or abnormal pap smears in past  Pap Smear: 03/16/2022    Screening Not Indicated  Screening Not Indicated   Hepatitis C Screening Once for adults born between 1945 and 1965  More frequently in patients at high risk for Hepatitis C Hep C Antibody: Not on file    Screening Not Indicated   Diabetes Screening 1-2 times per year if you're at risk for diabetes or have pre-diabetes Fasting glucose: 107 mg/dL (3/24/2022)  A1C: No results in last 5 years (No results in last 5 years)  Screening Current  Screening Current   Cholesterol Screening Once every 5 years if you don't have a lipid disorder  May order more often based on risk factors  Lipid panel: 10/16/2020    Screening Not Indicated  History Lipid Disorder  Screening Not Indicated  History Lipid Disorder  Screening Current     Other Preventive Screenings Covered by Medicare:  6  Abdominal Aortic Aneurysm (AAA) Screening: covered once if your at risk  You're considered to be at risk if you have a family history of AAA  7  Lung Cancer Screening: covers low dose CT scan once per year if you meet all of the following conditions: (1) Age 50-69; (2) No signs or symptoms of lung cancer; (3) Current smoker or have quit smoking within the last 15 years; (4) You have a tobacco smoking history of at least 20 pack years (packs per day multiplied by number of years you smoked); (5) You get a written order from a healthcare provider  8  Glaucoma Screening: covered annually if you're considered high risk: (1) You have diabetes OR (2) Family history of glaucoma OR (3)  aged 48 and older OR (3)  American aged 72 and older  5   Osteoporosis Screening: covered every 2 years if you meet one of the following conditions: (1) You're estrogen deficient and at risk for osteoporosis based off medical history and other findings; (2) Have a vertebral abnormality; (3) On glucocorticoid therapy for more than 3 months; (4) Have primary hyperparathyroidism; (5) On osteoporosis medications and need to assess response to drug therapy  · Last bone density test (DXA Scan): 06/18/2021  10  HIV Screening: covered annually if you're between the age of 12-76  Also covered annually if you are younger than 13 and older than 72 with risk factors for HIV infection  For pregnant patients, it is covered up to 3 times per pregnancy  Immunizations:  Immunization Recommendations   Influenza Vaccine Annual influenza vaccination during flu season is recommended for all persons aged >= 6 months who do not have contraindications   Pneumococcal Vaccine   * Pneumococcal conjugate vaccine = PCV13 (Prevnar 13), PCV15 (Vaxneuvance), PCV20 (Prevnar 20)  * Pneumococcal polysaccharide vaccine = PPSV23 (Pneumovax) Adults 25-60 years old: 1-3 doses may be recommended based on certain risk factors  Adults 72 years old: 1-2 doses may be recommended based off what pneumonia vaccine you previously received   Hepatitis B Vaccine 3 dose series if at intermediate or high risk (ex: diabetes, end stage renal disease, liver disease)   Tetanus (Td) Vaccine - COST NOT COVERED BY MEDICARE PART B Following completion of primary series, a booster dose should be given every 10 years to maintain immunity against tetanus  Td may also be given as tetanus wound prophylaxis  Tdap Vaccine - COST NOT COVERED BY MEDICARE PART B Recommended at least once for all adults  For pregnant patients, recommended with each pregnancy     Shingles Vaccine (Shingrix) - COST NOT COVERED BY MEDICARE PART B  2 shot series recommended in those aged 48 and above     Health Maintenance Due:      Topic Date Due   • Hepatitis C Screening  Never done   • Colorectal Cancer Screening  Never done   • Breast Cancer Screening: Mammogram  05/06/2023     Immunizations Due:      Topic Date Due   • Hepatitis B Vaccine (1 of 3 - 3-dose series) Never done   • COVID-19 Vaccine (4 - Booster for [employer] series) 03/18/2022     Advance Directives   What are advance directives? Advance directives are legal documents that state your wishes and plans for medical care  These plans are made ahead of time in case you lose your ability to make decisions for yourself  Advance directives can apply to any medical decision, such as the treatments you want, and if you want to donate organs  What are the types of advance directives? There are many types of advance directives, and each state has rules about how to use them  You may choose a combination of any of the following:  · Living will: This is a written record of the treatment you want  You can also choose which treatments you do not want, which to limit, and which to stop at a certain time  This includes surgery, medicine, IV fluid, and tube feedings  · Durable power of  for healthcare [facility]): This is a written record that states who you want to make healthcare choices for you when you are unable to make them for yourself  This person, called a proxy, is usually a family member or a friend  You may choose more than 1 proxy  · Do not resuscitate (DNR) order:  A DNR order is used in case your heart stops beating or you stop breathing  It is a request not to have certain forms of treatment, such as CPR  A DNR order may be included in other types of advance directives  · Medical directive: This covers the care that you want if you are in a coma, near death, or unable to make decisions for yourself  You can list the treatments you want for each condition  Treatment may include pain medicine, surgery, blood transfusions, dialysis, IV or tube feedings, and a ventilator (breathing machine)  · Values history: This document has questions about your views, beliefs, and how you feel and think about life  This information can help others choose the care that you would choose  Why are advance directives important? An advance directive helps you control your care  Although spoken wishes may be used, it is better to have your wishes written down  Spoken wishes can be misunderstood, or not followed  Treatments may be given even if you do not want them  An advance directive may make it easier for your family to make difficult choices about your care  © Copyright [facility] 2018 Information is for End User's use only and may not be sold, redistributed or otherwise used for commercial purposes   All illustrations and images included in CareNotes® are the copyrighted property of [employer]  or [address] [place] [address]

## 2022-11-16 NOTE — PROGRESS NOTES
Assessment and Plan:     Problem List Items Addressed This Visit    None       Preventive health issues were discussed with patient, and age appropriate screening tests were ordered as noted in patient's After Visit Summary  Personalized health advice and appropriate referrals for health education or preventive services given if needed, as noted in patient's After Visit Summary       History of Present Illness:     Patient presents for a Medicare Wellness Visit    HPI   Patient Care Team:  Judy Kent MD as PCP - General     Review of Systems:     Review of Systems     Problem List:     Patient Active Problem List   Diagnosis   • Allergic rhinitis   • Anxiety   • Colon polyp   • Essential hypertension   • Hypercholesterolemia   • Rash   • Age-related osteoporosis without current pathological fracture   • Depression      Past Medical and Surgical History:     Past Medical History:   Diagnosis Date   • Allergic      Past Surgical History:   Procedure Laterality Date   • BREAST BIOPSY Right 1996      Family History:     Family History   Problem Relation Age of Onset   • No Known Problems Mother    • No Known Problems Father    • No Known Problems Sister    • Breast cancer Cousin 72   • Breast cancer Other 59   • No Known Problems Maternal Grandmother    • No Known Problems Maternal Grandfather    • No Known Problems Paternal Grandmother    • No Known Problems Paternal Grandfather       Social History:     Social History     Socioeconomic History   • Marital status: Single     Spouse name: Not on file   • Number of children: Not on file   • Years of education: Not on file   • Highest education level: Not on file   Occupational History   • Not on file   Tobacco Use   • Smoking status: Never   • Smokeless tobacco: Never   • Tobacco comments:     no exposure passive smoke   Vaping Use   • Vaping Use: Never used   Substance and Sexual Activity   • Alcohol use: Yes     Comment: social   • Drug use: No   • Sexual activity: Yes     Partners: Male     Birth control/protection: Post-menopausal     Comment: boyfriend    Other Topics Concern   • Not on file   Social History Narrative   • Not on file     Social Determinants of Health     Financial Resource Strain: Unknown   • Difficulty of Paying Living Expenses: Patient refused   Food Insecurity: Not on file   Transportation Needs: No Transportation Needs   • Lack of Transportation (Medical): No   • Lack of Transportation (Non-Medical): No   Physical Activity: Not on file   Stress: Not on file   Social Connections: Not on file   Intimate Partner Violence: Not on file   Housing Stability: Not on file      Medications and Allergies:     Current Outpatient Medications   Medication Sig Dispense Refill   • beclomethasone (Beconase AQ) 42 MCG/SPRAY nasal spray 2 sprays into each nostril 2 (two) times a day Dose is for each nostril  25 g 12   • Ascorbic Acid (VITAMIN C PO) Take by mouth     • b complex vitamins tablet Take 1 tablet by mouth daily     • Calcium Carbonate-Vitamin D3 (CALCIUM 600+D3) 600-400 MG-UNIT TABS 2 DAILY     • cetirizine (ZyrTEC) 10 mg tablet Take 1 tablet (10 mg total) by mouth daily 90 tablet 6   • lisinopril (ZESTRIL) 10 mg tablet TAKE ONE TABLET BY MOUTH EVERY DAY 90 tablet 3   • montelukast (SINGULAIR) 10 mg tablet Take 1 tablet (10 mg total) by mouth daily at bedtime 90 tablet 3   • Omega-3 Fatty Acids (FISH OIL PO) Take by mouth     • sertraline (ZOLOFT) 50 mg tablet Take 1 tablet (50 mg total) by mouth daily 90 tablet 6   • tacrolimus (PROTOPIC) 0 1 % ointment        No current facility-administered medications for this visit       No Known Allergies   Immunizations:     Immunization History   Administered Date(s) Administered   • COVID-19 PFIZER VACCINE 0 3 ML IM 03/18/2021, 04/09/2021, 11/18/2021   • INFLUENZA 11/05/2015, 09/08/2016, 09/15/2017   • Influenza Split 10/22/2010, 11/18/2011, 11/28/2012, 09/10/2013, 11/26/2014, 11/05/2015   • Influenza Split High Dose Preservative Free IM 09/08/2016, 09/15/2017   • Influenza, high dose seasonal 0 7 mL 09/07/2018, 11/08/2019, 10/16/2020, 10/22/2021, 10/24/2022   • Influenza, seasonal, injectable 10/11/2007, 10/08/2008, 09/25/2009   • Pneumococcal Conjugate 13-Valent 05/07/2015   • Pneumococcal Polysaccharide PPV23 11/28/2012   • Tdap 10/08/2008   • Zoster 04/01/2016, 08/05/2016      Health Maintenance:         Topic Date Due   • Hepatitis C Screening  Never done   • Colorectal Cancer Screening  Never done   • Breast Cancer Screening: Mammogram  05/06/2023         Topic Date Due   • Hepatitis B Vaccine (1 of 3 - 3-dose series) Never done   • COVID-19 Vaccine (4 - Booster for Jimenes Peter series) 03/18/2022      Medicare Screening Tests and Risk Assessments:     Dayan Mckay is here for her Subsequent Wellness visit  Health Risk Assessment:   Patient rates overall health as very good  Patient feels that their physical health rating is same  Patient is very satisfied with their life  Eyesight was rated as same  Hearing was rated as same  Patient feels that their emotional and mental health rating is same  Patients states they are never, rarely angry  Patient states they are never, rarely unusually tired/fatigued  Pain experienced in the last 7 days has been none  Patient states that she has experienced no weight loss or gain in last 6 months  Fall Risk Screening: In the past year, patient has experienced: no history of falling in past year      Urinary Incontinence Screening:   Patient has not leaked urine accidently in the last six months  Home Safety:  Patient does not have trouble with stairs inside or outside of their home  Patient has working smoke alarms and has no working carbon monoxide detector  Home safety hazards include: none  Nutrition:   Current diet is Regular  Medications:   Patient is currently taking over-the-counter supplements  OTC medications include: Vitamin C, Collagen supplements   Patient is able to manage medications  Activities of Daily Living (ADLs)/Instrumental Activities of Daily Living (IADLs):   Walk and transfer into and out of bed and chair?: Yes  Dress and groom yourself?: Yes    Bathe or shower yourself?: Yes    Feed yourself? Yes  Do your laundry/housekeeping?: Yes  Manage your money, pay your bills and track your expenses?: Yes  Make your own meals?: Yes    Do your own shopping?: Yes    Previous Hospitalizations:   Any hospitalizations or ED visits within the last 12 months?: No      Advance Care Planning:   Living will: Yes    Durable POA for healthcare: Yes    Advanced directive: No      PREVENTIVE SCREENINGS      Cardiovascular Screening:    General: Screening Not Indicated and History Lipid Disorder      Diabetes Screening:     General: Screening Current      Breast Cancer Screening:     General: Screening Current      Cervical Cancer Screening:    General: Screening Not Indicated      Osteoporosis Screening:    General: Screening Not Indicated and History Osteoporosis      Lung Cancer Screening:     General: Screening Not Indicated    Screening, Brief Intervention, and Referral to Treatment (SBIRT)    Screening  Typical number of drinks in a day: 0  Typical number of drinks in a week: 1  Interpretation: Low risk drinking behavior      AUDIT-C Screenin) How often did you have a drink containing alcohol in the past year? monthly or less  2) How many drinks did you have on a typical day when you were drinking in the past year? 0  3) How often did you have 6 or more drinks on one occasion in the past year? never    AUDIT-C Score: 1  Interpretation: Score 0-2 (female): Negative screen for alcohol misuse    Single Item Drug Screening:  How often have you used an illegal drug (including marijuana) or a prescription medication for non-medical reasons in the past year? never    Single Item Drug Screen Score: 0  Interpretation: Negative screen for possible drug use disorder    No results found      Physical Exam:     LMP  (LMP Unknown)     Physical Exam     Chayito Hassan MD

## 2022-11-16 NOTE — PROGRESS NOTES
Assessment/Plan:    Colon polyp  UTD       Diagnoses and all orders for this visit:    Hypercholesterolemia    Screening for colorectal cancer    Medicare annual wellness visit, subsequent    Allergic rhinitis, unspecified seasonality, unspecified trigger    Essential hypertension, benign    Depression    Age-related osteoporosis without current pathological fracture    Essential hypertension    Anxiety          Subjective:      Patient ID: Sandie Portillo is a 78 y o  female  PATIENT RETURNS FOR FOLLOW-UP OF CHRONIC MEDICAL CONDITIONS  ANY HOSPITAL VISITS, EMERGENCY VISITS AND OTHER PROVIDER VISITS SINCE LAST TIME WERE REVIEWED  MEDS WERE REVIEWED AND NO SIDE EFFECTS  NO NEW ISSUES  UNLESS NOTED BELOW  NO NEW MEDICAL PROVIDER REPORTED  THE CHRONIC DISEASES LISTED ABOVE ARE STABLE AND UNCHANGED/ THE PLAN OF CARE FOR THOSE WILL REMAIN UNCHANGED UNLESS NOTED BELOW     AWv/sub       The following portions of the patient's history were reviewed and updated as appropriate: allergies, current medications, past family history, past medical history, past social history, past surgical history and problem list     Review of Systems   Constitutional: Negative for activity change and appetite change  HENT: Negative for voice change  Eyes: Negative for visual disturbance  Respiratory: Negative for chest tightness and shortness of breath  Cardiovascular: Negative for chest pain, palpitations and leg swelling  Gastrointestinal: Negative for abdominal pain, blood in stool, constipation and diarrhea  Genitourinary: Negative for dysuria, vaginal bleeding and vaginal discharge  Skin: Negative for rash  Neurological: Negative for dizziness  Psychiatric/Behavioral: Negative for dysphoric mood           Objective:  Vitals:    11/16/22 1131   BP: 102/60   BP Location: Left arm   Patient Position: Sitting   Cuff Size: Adult   Pulse: 80   Temp: (!) 96 6 °F (35 9 °C)   TempSrc: Temporal   SpO2: 98%   Weight: 59 1 kg (130 lb 3 2 oz)   Height: 5' 6 25" (1 683 m)      Physical Exam  Constitutional:       Appearance: She is well-developed and well-nourished  HENT:      Head: Normocephalic and atraumatic  Eyes:      Conjunctiva/sclera: Conjunctivae normal    Neck:      Thyroid: No thyromegaly  Cardiovascular:      Rate and Rhythm: Normal rate and regular rhythm  Pulses: Intact distal pulses  Heart sounds: Normal heart sounds  No murmur heard  Pulmonary:      Effort: Pulmonary effort is normal  No respiratory distress  Breath sounds: Normal breath sounds  Musculoskeletal:         General: No edema  Cervical back: Neck supple  Lymphadenopathy:      Cervical: No cervical adenopathy  Skin:     General: Skin is warm and dry  Psychiatric:         Mood and Affect: Mood and affect normal          Behavior: Behavior normal          Answers for HPI/ROS submitted by the patient on 11/13/2022  How would you rate your overall health?: very good  Compared to last year, how is your physical health?: same  In general, how satisfied are you with your life?: very satisfied  Compared to last year, how is your eyesight?: same  Compared to last year, how is your hearing?: same  Compared to last year, how is your emotional/mental health?: same  How often is anger a problem for you?: never, rarely  How often do you feel unusually tired/fatigued?: never, rarely  In the past 7 days, how much pain have you experienced?: none  In the past 6 months, have you lost or gained 10 pounds without trying?: No  One or more falls in the last year: No  In the past 6 months, have you accidentally leaked urine?: No  Do you have trouble with the stairs inside or outside your home?: No  Does your home have working smoke alarms?: Yes  Does your home have a carbon monoxide monitor?: No  Which safety hazards (if any) have you experienced in your home? Please select all that apply : none  How would you describe your current diet?  Please select all that apply : Regular  In addition to prescription medications, are you taking any over-the-counter supplements?: Yes  If yes, what supplements are you taking?: Vitamin C, Collagen supplements  Can you manage your medications?: Yes  Are you currently taking any opioid medications?: No  Can you walk and transfer into and out of your bed and chair?: Yes  Can you dress and groom yourself?: Yes  Can you bathe or shower yourself?: Yes  Can you feed yourself?: Yes  Can you do your laundry/ housekeeping?: Yes  Can you manage your money, pay your bills, and track your expenses?: Yes  Can you make your own meals?: Yes  Can you do your own shopping?: Yes  Within the last 12 months, have you had any hospitalizations or Emergency Department visits?: No  Do you have a living will?: Yes  Do you have a Durable POA (Power of ) for healthcare decisions?: Yes  Do you have an Advanced Directive for end of life decisions?: No  How often have you used an illegal drug (including marijuana) or a prescription medication for non-medical reasons in the past year?: never  What is the typical number of drinks you consume in a day?: 0  What is the typical number of drinks you consume in a week?: 1  How often did you have a drink containing alcohol in the past year?: monthly or less  How many drinks did you have on a typical day  when you were drinking in the past year?: 0  How often did you have 6 or more drinks on one occasion in the past year?: never

## 2022-11-16 NOTE — PROGRESS NOTES
Assessment and Plan:     Problem List Items Addressed This Visit        Respiratory    Allergic rhinitis       Cardiovascular and Mediastinum    Essential hypertension       Musculoskeletal and Integument    Age-related osteoporosis without current pathological fracture       Other    Anxiety    Hypercholesterolemia - Primary    Depression   Other Visit Diagnoses     Screening for colorectal cancer        Medicare annual wellness visit, subsequent        Essential hypertension, benign        Chronic sinusitis, unspecified location               Preventive health issues were discussed with patient, and age appropriate screening tests were ordered as noted in patient's After Visit Summary  Personalized health advice and appropriate referrals for health education or preventive services given if needed, as noted in patient's After Visit Summary       History of Present Illness:     Patient presents for a Medicare Wellness Visit    HPI   Patient Care Team:  Cinthia Cervantes MD as PCP - General     Review of Systems:     Review of Systems     Problem List:     Patient Active Problem List   Diagnosis   • Allergic rhinitis   • Anxiety   • Colon polyp   • Essential hypertension   • Hypercholesterolemia   • Rash   • Age-related osteoporosis without current pathological fracture   • Depression      Past Medical and Surgical History:     Past Medical History:   Diagnosis Date   • Allergic    • HL (hearing loss) from birth    wear hearing aid     Past Surgical History:   Procedure Laterality Date   • BREAST BIOPSY Right 1996      Family History:     Family History   Problem Relation Age of Onset   • Arthritis Mother    • No Known Problems Father    • No Known Problems Sister    • Breast cancer Cousin 72   • Breast cancer Other 59   • No Known Problems Maternal Grandmother    • No Known Problems Maternal Grandfather    • No Known Problems Paternal Grandmother    • No Known Problems Paternal Grandfather       Social History: Social History     Socioeconomic History   • Marital status: Single     Spouse name: None   • Number of children: None   • Years of education: None   • Highest education level: None   Occupational History   • None   Tobacco Use   • Smoking status: Never   • Smokeless tobacco: Never   • Tobacco comments:     no exposure passive smoke   Vaping Use   • Vaping Use: Never used   Substance and Sexual Activity   • Alcohol use: Yes     Alcohol/week: 1 0 standard drink     Types: 1 Glasses of wine per week     Comment: social   • Drug use: No   • Sexual activity: Yes     Partners: Male     Birth control/protection: Post-menopausal     Comment: boyfriend    Other Topics Concern   • None   Social History Narrative   • None     Social Determinants of Health     Financial Resource Strain: Unknown   • Difficulty of Paying Living Expenses: Patient refused   Food Insecurity: Not on file   Transportation Needs: No Transportation Needs   • Lack of Transportation (Medical): No   • Lack of Transportation (Non-Medical): No   Physical Activity: Not on file   Stress: Not on file   Social Connections: Not on file   Intimate Partner Violence: Not on file   Housing Stability: Not on file      Medications and Allergies:     Current Outpatient Medications   Medication Sig Dispense Refill   • Ascorbic Acid (VITAMIN C PO) Take by mouth     • b complex vitamins tablet Take 1 tablet by mouth daily     • beclomethasone (Beconase AQ) 42 MCG/SPRAY nasal spray 2 sprays into each nostril 2 (two) times a day Dose is for each nostril   25 g 12   • Calcium Carb-Cholecalciferol (Calcium Carbonate-Vitamin D3) 600-400 MG-UNIT TABS 2 DAILY     • cetirizine (ZyrTEC) 10 mg tablet Take 1 tablet (10 mg total) by mouth daily 90 tablet 6   • lisinopril (ZESTRIL) 10 mg tablet TAKE ONE TABLET BY MOUTH EVERY DAY 90 tablet 3   • montelukast (SINGULAIR) 10 mg tablet Take 1 tablet (10 mg total) by mouth daily at bedtime 90 tablet 3   • Omega-3 Fatty Acids (FISH OIL PO) Take by mouth     • sertraline (ZOLOFT) 50 mg tablet Take 1 tablet (50 mg total) by mouth daily 90 tablet 6   • tacrolimus (PROTOPIC) 0 1 % ointment        No current facility-administered medications for this visit  No Known Allergies   Immunizations:     Immunization History   Administered Date(s) Administered   • COVID-19 PFIZER VACCINE 0 3 ML IM 03/18/2021, 04/09/2021, 11/18/2021   • INFLUENZA 11/05/2015, 09/08/2016, 09/15/2017   • Influenza Split 10/22/2010, 11/18/2011, 11/28/2012, 09/10/2013, 11/26/2014, 11/05/2015   • Influenza Split High Dose Preservative Free IM 09/08/2016, 09/15/2017   • Influenza, high dose seasonal 0 7 mL 09/07/2018, 11/08/2019, 10/16/2020, 10/22/2021, 10/24/2022   • Influenza, seasonal, injectable 10/11/2007, 10/08/2008, 09/25/2009   • Pneumococcal Conjugate 13-Valent 05/07/2015   • Pneumococcal Polysaccharide PPV23 11/28/2012   • Tdap 10/08/2008   • Zoster 04/01/2016, 08/05/2016      Health Maintenance:         Topic Date Due   • Hepatitis C Screening  Never done   • Colorectal Cancer Screening  Never done   • Breast Cancer Screening: Mammogram  05/06/2023         Topic Date Due   • Hepatitis B Vaccine (1 of 3 - 3-dose series) Never done   • COVID-19 Vaccine (4 - Booster for Jimenes Atul series) 03/18/2022      Medicare Screening Tests and Risk Assessments:     Layman Cheadle is here for her Subsequent Wellness visit  Last Medicare Wellness visit information reviewed, patient interviewed, no change since last AWV  Health Risk Assessment:   Patient rates overall health as very good  Patient feels that their physical health rating is same  Patient is very satisfied with their life  Eyesight was rated as same  Hearing was rated as same  Patient feels that their emotional and mental health rating is same  Patients states they are never, rarely angry  Patient states they are never, rarely unusually tired/fatigued  Pain experienced in the last 7 days has been none   Patient states that she has experienced no weight loss or gain in last 6 months  Depression Screening:   PHQ-9 Score: 0      Fall Risk Screening: In the past year, patient has experienced: no history of falling in past year      Urinary Incontinence Screening:   Patient has not leaked urine accidently in the last six months  Home Safety:  Patient does not have trouble with stairs inside or outside of their home  Patient has working smoke alarms and has no working carbon monoxide detector  Home safety hazards include: none  Nutrition:   Current diet is Regular  Medications:   Patient is currently taking over-the-counter supplements  OTC medications include: Vitamin C, Collagen supplements  Patient is able to manage medications  Activities of Daily Living (ADLs)/Instrumental Activities of Daily Living (IADLs):   Walk and transfer into and out of bed and chair?: Yes  Dress and groom yourself?: Yes    Bathe or shower yourself?: Yes    Feed yourself? Yes  Do your laundry/housekeeping?: Yes  Manage your money, pay your bills and track your expenses?: Yes  Make your own meals?: Yes    Do your own shopping?: Yes    Previous Hospitalizations:   Any hospitalizations or ED visits within the last 12 months?: No      Advance Care Planning:   Living will: Yes    Durable POA for healthcare:  Yes    Advanced directive: No      Cognitive Screening:   Provider or family/friend/caregiver concerned regarding cognition?: No    PREVENTIVE SCREENINGS      Cardiovascular Screening:    General: Screening Not Indicated, History Lipid Disorder and Screening Current      Diabetes Screening:     General: Screening Current      Colorectal Cancer Screening:     General: Risks and Benefits Discussed      Breast Cancer Screening:     General: Screening Current      Cervical Cancer Screening:    General: Screening Not Indicated      Osteoporosis Screening:    General: Screening Not Indicated and History Osteoporosis      Abdominal Aortic Aneurysm (AAA) Screening:        General: Screening Not Indicated      Lung Cancer Screening:     General: Screening Not Indicated      Hepatitis C Screening:    General: Screening Not Indicated    Screening, Brief Intervention, and Referral to Treatment (SBIRT)    Screening  Typical number of drinks in a day: 0  Typical number of drinks in a week: 1  Interpretation: Low risk drinking behavior  AUDIT-C Screenin) How often did you have a drink containing alcohol in the past year? monthly or less  2) How many drinks did you have on a typical day when you were drinking in the past year? 0  3) How often did you have 6 or more drinks on one occasion in the past year? never    AUDIT-C Score: 1  Interpretation: Score 0-2 (female): Negative screen for alcohol misuse    Single Item Drug Screening:  How often have you used an illegal drug (including marijuana) or a prescription medication for non-medical reasons in the past year? never    Single Item Drug Screen Score: 0  Interpretation: Negative screen for possible drug use disorder    Brief Intervention  Alcohol & drug use screenings were reviewed  No concerns regarding substance use disorder identified  Exercising     No results found  Physical Exam:     /60 (BP Location: Left arm, Patient Position: Sitting, Cuff Size: Adult)   Pulse 80   Temp (!) 96 6 °F (35 9 °C) (Temporal)   Ht 5' 6 25" (1 683 m)   Wt 59 1 kg (130 lb 3 2 oz)   LMP  (LMP Unknown)   SpO2 98%   BMI 20 86 kg/m²     Physical Exam     Patient's chronic problems that were reviewed today are stable  Recent hospital stays reviewed  Recent labs and imaging reviewed  Recent visits to other providers reviewed  Meds reviewed and no changes made  Appropriate labs and imaging were ordered  Preventive measures appropriate for age and sex were reviewed with patient  Immunizations were updated as appropriate           Jimmie Wong MD

## 2022-12-12 DIAGNOSIS — F32.A DEPRESSION: ICD-10-CM

## 2023-02-08 DIAGNOSIS — I10 ESSENTIAL HYPERTENSION, BENIGN: ICD-10-CM

## 2023-02-08 DIAGNOSIS — R21 RASH: Primary | ICD-10-CM

## 2023-02-08 RX ORDER — TACROLIMUS 1 MG/G
OINTMENT TOPICAL DAILY
Qty: 100 G | Refills: 3 | Status: SHIPPED | OUTPATIENT
Start: 2023-02-08

## 2023-02-08 RX ORDER — LISINOPRIL 10 MG/1
10 TABLET ORAL DAILY
Qty: 90 TABLET | Refills: 3 | Status: SHIPPED | OUTPATIENT
Start: 2023-02-08

## 2023-03-13 DIAGNOSIS — F32.A DEPRESSION: ICD-10-CM

## 2023-05-08 ENCOUNTER — HOSPITAL ENCOUNTER (OUTPATIENT)
Dept: RADIOLOGY | Age: 78
Discharge: HOME | End: 2023-05-08

## 2023-05-08 VITALS — BODY MASS INDEX: 20.89 KG/M2 | WEIGHT: 130 LBS | HEIGHT: 66 IN

## 2023-05-08 DIAGNOSIS — Z12.31 ENCOUNTER FOR SCREENING MAMMOGRAM FOR MALIGNANT NEOPLASM OF BREAST: ICD-10-CM

## 2023-05-09 ENCOUNTER — RA CDI HCC (OUTPATIENT)
Dept: OTHER | Facility: HOSPITAL | Age: 78
End: 2023-05-09

## 2023-05-09 NOTE — PROGRESS NOTES
Previous suggestion used  [facility]  coding opportunities       Chart reviewed, no opportunity found:   [address]        Patients Insurance     Medicare Insurance: [employer]
2

## 2023-05-12 ENCOUNTER — ANNUAL EXAM (OUTPATIENT)
Dept: OBGYN CLINIC | Facility: CLINIC | Age: 78
End: 2023-05-12

## 2023-05-12 VITALS
WEIGHT: 131.8 LBS | DIASTOLIC BLOOD PRESSURE: 80 MMHG | HEIGHT: 66 IN | BODY MASS INDEX: 21.18 KG/M2 | SYSTOLIC BLOOD PRESSURE: 134 MMHG

## 2023-05-12 DIAGNOSIS — Z12.31 ENCOUNTER FOR SCREENING MAMMOGRAM FOR MALIGNANT NEOPLASM OF BREAST: ICD-10-CM

## 2023-05-12 DIAGNOSIS — Z01.419 ENCNTR FOR GYN EXAM (GENERAL) (ROUTINE) W/O ABN FINDINGS: Primary | ICD-10-CM

## 2023-05-12 DIAGNOSIS — M81.0 OSTEOPOROSIS, UNSPECIFIED OSTEOPOROSIS TYPE, UNSPECIFIED PATHOLOGICAL FRACTURE PRESENCE: ICD-10-CM

## 2023-05-12 RX ORDER — MULTIVIT-MIN/IRON FUM/FOLIC AC 7.5 MG-4
1 TABLET ORAL DAILY
COMMUNITY

## 2023-05-12 NOTE — PROGRESS NOTES
Gatito Guzman   1945    CC:  Yearly exam    S:  78 y o  female here for yearly exam  She is postmenopausal and has had no vaginal bleeding  She denies vaginal discharge, itching, odor or dryness  Retired in April! Sexual activity: She is sexually active without pain, bleeding with her boyfriend  She does note dryness with intercourse, does use lubricant - astroglide works well for her  She does not report urinary incontinence, urinary concerns, bowel problems, breast concerns  Last Pap: 2/24/21 - NILM  Last Mammo: 5/8/23 - BIRad 2, scheduled 2024  Last Colonoscopy: 10+yrs ago  Last DEXA: 6/18/21 - osteoporosis    We reviewed ASCCP guidelines for Pap testing       Family hx of breast cancer: cousin, niece  Family hx of ovarian cancer: no  Family hx of colon cancer: no      Current Outpatient Medications:   •  Ascorbic Acid (VITAMIN C PO), Take by mouth, Disp: , Rfl:   •  b complex vitamins tablet, Take 1 tablet by mouth daily, Disp: , Rfl:   •  beclomethasone (Beconase AQ) 42 MCG/SPRAY nasal spray, 2 sprays into each nostril 2 (two) times a day Dose is for each nostril , Disp: 25 g, Rfl: 12  •  Calcium Carb-Cholecalciferol (Calcium Carbonate-Vitamin D3) 600-400 MG-UNIT TABS, 2 DAILY, Disp: , Rfl:   •  cetirizine (ZyrTEC) 10 mg tablet, Take 1 tablet (10 mg total) by mouth daily, Disp: 90 tablet, Rfl: 6  •  lisinopril (ZESTRIL) 10 mg tablet, Take 1 tablet (10 mg total) by mouth daily, Disp: 90 tablet, Rfl: 3  •  montelukast (SINGULAIR) 10 mg tablet, Take 1 tablet (10 mg total) by mouth daily at bedtime, Disp: 90 tablet, Rfl: 3  •  Multiple Vitamins-Minerals (multivitamin with minerals) tablet, Take 1 tablet by mouth daily, Disp: , Rfl:   •  Omega-3 Fatty Acids (FISH OIL PO), Take by mouth, Disp: , Rfl:   •  sertraline (ZOLOFT) 50 mg tablet, TAKE ONE TABLET BY MOUTH EVERY DAY, Disp: 90 tablet, Rfl: 6  •  tacrolimus (PROTOPIC) 0 1 % ointment, Apply topically in the morning, Disp: 100 g, Rfl: "3  Patient Active Problem List   Diagnosis   • Allergic rhinitis   • Anxiety   • Colon polyp   • Essential hypertension   • Hypercholesterolemia   • Rash   • Age-related osteoporosis without current pathological fracture   • Depression     Family History   Problem Relation Age of Onset   • Arthritis Mother    • No Known Problems Father    • No Known Problems Sister    • Breast cancer Cousin 72   • Breast cancer Other 59   • No Known Problems Maternal Grandmother    • No Known Problems Maternal Grandfather    • No Known Problems Paternal Grandmother    • No Known Problems Paternal Grandfather      Past Medical History:   Diagnosis Date   • Allergic    • HL (hearing loss) from birth    wear hearing aid   • Varicella 12years old    childhood        Review of Systems   Respiratory: Negative  Cardiovascular: Negative  Gastrointestinal: Negative for constipation and diarrhea  Genitourinary: Negative for difficulty urinating, pelvic pain, vaginal bleeding, vaginal discharge, itching or odor  O:  Blood pressure 134/80, height 5' 5 75\" (1 67 m), weight 59 8 kg (131 lb 12 8 oz), not currently breastfeeding  Patient appears well and is not in distress  Breasts are symmetrical without mass, tenderness, nipple discharge, skin changes or adenopathy  Abdomen is soft and nontender without masses  External genitals are normal without lesions or rashes + multiple small sebaceous cysts, some drained today on bilateral labia  Urethral meatus and urethra are normal  Bladder is normal to palpation  Vagina is normal without discharge or bleeding, generalized atrophic changes present   Cervix is normal without discharge or lesion  Uterus is normal, mobile, nontender without palpable mass  Adnexa are normal, nontender, without palpable mass       A:  Yearly exam      P:   Pap not indicated  Mammo up to date, scheduled   Colon Cancer Screening due   DEXA ordered      Reviewed considerations of menopause, to call with any " postmenopausal bleeding or other concerns  RTO one year for yearly exam or sooner as needed

## 2023-05-16 ENCOUNTER — OFFICE VISIT (OUTPATIENT)
Dept: FAMILY MEDICINE CLINIC | Facility: CLINIC | Age: 78
End: 2023-05-16

## 2023-05-16 VITALS
HEIGHT: 66 IN | HEART RATE: 120 BPM | WEIGHT: 131.6 LBS | TEMPERATURE: 97.6 F | BODY MASS INDEX: 21.15 KG/M2 | DIASTOLIC BLOOD PRESSURE: 82 MMHG | SYSTOLIC BLOOD PRESSURE: 110 MMHG | OXYGEN SATURATION: 98 %

## 2023-05-16 DIAGNOSIS — M81.0 AGE-RELATED OSTEOPOROSIS WITHOUT CURRENT PATHOLOGICAL FRACTURE: ICD-10-CM

## 2023-05-16 DIAGNOSIS — I10 ESSENTIAL HYPERTENSION: ICD-10-CM

## 2023-05-16 DIAGNOSIS — J30.9 ALLERGIC RHINITIS, UNSPECIFIED SEASONALITY, UNSPECIFIED TRIGGER: ICD-10-CM

## 2023-05-16 DIAGNOSIS — E78.00 HYPERCHOLESTEROLEMIA: Primary | ICD-10-CM

## 2023-05-16 DIAGNOSIS — K63.5 POLYP OF COLON, UNSPECIFIED PART OF COLON, UNSPECIFIED TYPE: ICD-10-CM

## 2023-05-16 DIAGNOSIS — F32.A DEPRESSION, UNSPECIFIED DEPRESSION TYPE: ICD-10-CM

## 2023-05-16 RX ORDER — FEXOFENADINE HCL 180 MG/1
180 TABLET ORAL DAILY
Qty: 90 TABLET | Refills: 3 | Status: SHIPPED | OUTPATIENT
Start: 2023-05-16

## 2023-05-16 RX ORDER — FEXOFENADINE HCL AND PSEUDOEPHEDRINE HCI 180; 240 MG/1; MG/1
1 TABLET, EXTENDED RELEASE ORAL DAILY
COMMUNITY
End: 2023-05-16

## 2023-05-16 NOTE — PROGRESS NOTES
"Assessment/Plan:    No problem-specific Assessment & Plan notes found for this encounter  There are no diagnoses linked to this encounter  Subjective:      Patient ID: Pramod Joseph is a 78 y o  female  PATIENT RETURNS FOR FOLLOW-UP OF CHRONIC MEDICAL CONDITIONS  ANY HOSPITAL VISITS, EMERGENCY VISITS AND OTHER PROVIDER VISITS SINCE LAST TIME WERE REVIEWED  MEDS WERE REVIEWED AND NO SIDE EFFECTS  NO NEW ISSUES  UNLESS NOTED BELOW  NO NEW MEDICAL PROVIDER REPORTED  THE CHRONIC DISEASES LISTED ABOVE ARE STABLE AND UNCHANGED/ THE PLAN OF CARE FOR THOSE WILL REMAIN UNCHANGED UNLESS NOTED BELOW  Sees DERm / Gyn  The following portions of the patient's history were reviewed and updated as appropriate: allergies, current medications, past family history, past medical history, past social history, past surgical history and problem list     Review of Systems   Constitutional: Negative for activity change and appetite change  HENT: Negative for voice change  Eyes: Negative for visual disturbance  Respiratory: Negative for chest tightness and shortness of breath  Cardiovascular: Negative for chest pain, palpitations and leg swelling  Gastrointestinal: Negative for abdominal pain, blood in stool, constipation and diarrhea  Genitourinary: Negative for dysuria, vaginal bleeding and vaginal discharge  Skin: Negative for rash  Neurological: Negative for dizziness  Psychiatric/Behavioral: Negative for dysphoric mood  Objective:  Vitals:    05/16/23 0902   BP: 110/82   BP Location: Left arm   Patient Position: Sitting   Cuff Size: Adult   Pulse: (!) 120   Temp: 97 6 °F (36 4 °C)   SpO2: 98%   Weight: 59 7 kg (131 lb 9 6 oz)   Height: 5' 5 75\" (1 67 m)      Physical Exam  Constitutional:       Appearance: She is well-developed  HENT:      Head: Normocephalic and atraumatic  Eyes:      Conjunctiva/sclera: Conjunctivae normal    Neck:      Thyroid: No thyromegaly   " Cardiovascular:      Rate and Rhythm: Normal rate and regular rhythm  Heart sounds: Normal heart sounds  No murmur heard  Pulmonary:      Effort: Pulmonary effort is normal  No respiratory distress  Breath sounds: Normal breath sounds  Musculoskeletal:      Cervical back: Neck supple  Lymphadenopathy:      Cervical: No cervical adenopathy  Skin:     General: Skin is warm and dry  Psychiatric:         Behavior: Behavior normal          Patient's chronic problems that were reviewed today are stable  Recent hospital stays reviewed  Recent labs and imaging reviewed  Recent visits to other providers reviewed  Meds reviewed and no changes made  Appropriate labs and imaging were ordered  Preventive measures appropriate for age and sex were reviewed with patient  Immunizations were updated as appropriate

## 2023-05-16 NOTE — PATIENT INSTRUCTIONS
Attempt to gradually reduce the dose of sertraline as follows: Take 1/2 tablet daily for 4 to 5 weeks  If you notice no difference you may reduce to half tablet every other day for 4 to 5 weeks and then if still no difference you may stop it  Recently the CDC and FDA have recommended yet another booster for patients at higher risk for COVID  It is your choice  Also consider updating your tetanus shot with an adult DTaP

## 2023-08-16 DIAGNOSIS — I10 ESSENTIAL HYPERTENSION, BENIGN: ICD-10-CM

## 2023-08-16 RX ORDER — LISINOPRIL 10 MG/1
10 TABLET ORAL DAILY
Qty: 90 TABLET | Refills: 3 | Status: SHIPPED | OUTPATIENT
Start: 2023-08-16

## 2023-10-16 ENCOUNTER — CLINICAL SUPPORT (OUTPATIENT)
Dept: FAMILY MEDICINE CLINIC | Facility: CLINIC | Age: 78
End: 2023-10-16
Payer: COMMERCIAL

## 2023-10-16 DIAGNOSIS — Z23 NEED FOR INFLUENZA VACCINATION: Primary | ICD-10-CM

## 2023-10-16 PROCEDURE — 90662 IIV NO PRSV INCREASED AG IM: CPT

## 2023-10-16 PROCEDURE — G0008 ADMIN INFLUENZA VIRUS VAC: HCPCS

## 2023-10-19 ENCOUNTER — HOSPITAL ENCOUNTER (OUTPATIENT)
Dept: RADIOLOGY | Age: 78
Discharge: HOME | End: 2023-10-19
Payer: COMMERCIAL

## 2023-10-19 DIAGNOSIS — M81.0 OSTEOPOROSIS, UNSPECIFIED OSTEOPOROSIS TYPE, UNSPECIFIED PATHOLOGICAL FRACTURE PRESENCE: ICD-10-CM

## 2023-10-19 PROCEDURE — 77080 DXA BONE DENSITY AXIAL: CPT

## 2023-10-23 ENCOUNTER — TELEPHONE (OUTPATIENT)
Age: 78
End: 2023-10-23

## 2023-10-23 NOTE — TELEPHONE ENCOUNTER
Contacted patient to review DXA results and advise PCP follow up. Patient verbalizes understanding and has appointment with PCP next month. Aware Dr. Joe Cohn no longer practicing at [facility]. Patient does not intend to switch practices and will be reaching out SLOGA to reschedule her yearly with a different provider.

## 2023-10-23 NOTE — TELEPHONE ENCOUNTER
----- Message from Shannan Kwok MD sent at 10/23/2023 12:28 PM EDT -----  Bone density reviewed - still with diagnosis of osteoporosis, with a slight decrease in her bone density. Can she follow up with her PCP to discuss further treatment options?

## 2023-11-20 ENCOUNTER — OFFICE VISIT (OUTPATIENT)
Dept: FAMILY MEDICINE CLINIC | Facility: CLINIC | Age: 78
End: 2023-11-20
Payer: COMMERCIAL

## 2023-11-20 VITALS
SYSTOLIC BLOOD PRESSURE: 122 MMHG | OXYGEN SATURATION: 99 % | HEART RATE: 63 BPM | BODY MASS INDEX: 21.34 KG/M2 | DIASTOLIC BLOOD PRESSURE: 80 MMHG | RESPIRATION RATE: 18 BRPM | HEIGHT: 66 IN | TEMPERATURE: 97 F | WEIGHT: 132.8 LBS

## 2023-11-20 DIAGNOSIS — E78.00 HYPERCHOLESTEROLEMIA: ICD-10-CM

## 2023-11-20 DIAGNOSIS — Z00.00 MEDICARE ANNUAL WELLNESS VISIT, SUBSEQUENT: ICD-10-CM

## 2023-11-20 DIAGNOSIS — J30.9 ALLERGIC RHINITIS, UNSPECIFIED SEASONALITY, UNSPECIFIED TRIGGER: ICD-10-CM

## 2023-11-20 DIAGNOSIS — Z12.11 SCREENING FOR COLORECTAL CANCER: ICD-10-CM

## 2023-11-20 DIAGNOSIS — Z11.59 NEED FOR HEPATITIS C SCREENING TEST: Primary | ICD-10-CM

## 2023-11-20 DIAGNOSIS — I10 ESSENTIAL HYPERTENSION, BENIGN: ICD-10-CM

## 2023-11-20 DIAGNOSIS — I10 ESSENTIAL HYPERTENSION: ICD-10-CM

## 2023-11-20 DIAGNOSIS — Z12.12 SCREENING FOR COLORECTAL CANCER: ICD-10-CM

## 2023-11-20 DIAGNOSIS — F32.A DEPRESSION, UNSPECIFIED DEPRESSION TYPE: ICD-10-CM

## 2023-11-20 DIAGNOSIS — K63.5 POLYP OF COLON, UNSPECIFIED PART OF COLON, UNSPECIFIED TYPE: ICD-10-CM

## 2023-11-20 DIAGNOSIS — M81.0 AGE-RELATED OSTEOPOROSIS WITHOUT CURRENT PATHOLOGICAL FRACTURE: ICD-10-CM

## 2023-11-20 PROCEDURE — G0439 PPPS, SUBSEQ VISIT: HCPCS | Performed by: FAMILY MEDICINE

## 2023-11-20 PROCEDURE — 99214 OFFICE O/P EST MOD 30 MIN: CPT | Performed by: FAMILY MEDICINE

## 2023-11-20 RX ORDER — FEXOFENADINE HCL 180 MG/1
180 TABLET ORAL DAILY
Qty: 90 TABLET | Refills: 3 | Status: SHIPPED | OUTPATIENT
Start: 2023-11-20

## 2023-11-20 RX ORDER — AMOXICILLIN 500 MG/1
500 TABLET, FILM COATED ORAL 3 TIMES DAILY
Qty: 21 TABLET | Refills: 0 | Status: SHIPPED | OUTPATIENT
Start: 2023-11-20 | End: 2023-11-27

## 2023-11-20 RX ORDER — MONTELUKAST SODIUM 10 MG/1
10 TABLET ORAL
Qty: 90 TABLET | Refills: 3 | Status: SHIPPED | OUTPATIENT
Start: 2023-11-20

## 2023-11-20 NOTE — PROGRESS NOTES
Assessment/Plan:    No problem-specific Assessment & Plan notes found for this encounter. Subjective:      Patient ID: Juany Stephenson is a 70 y.o. female. PATIENT RETURNS FOR FOLLOW-UP OF CHRONIC MEDICAL CONDITIONS. ANY HOSPITAL VISITS, EMERGENCY VISITS AND OTHER PROVIDER VISITS SINCE LAST TIME WERE REVIEWED. MEDS WERE REVIEWED AND NO SIDE EFFECTS. NO NEW ISSUES  UNLESS NOTED BELOW. NO NEW MEDICAL PROVIDER REPORTED. THE CHRONIC DISEASES LISTED ABOVE ARE STABLE AND UNCHANGED/ THE PLAN OF CARE FOR THOSE WILL REMAIN UNCHANGED UNLESS NOTED BELOW. Awv/sub     Able to eliminate zoloft        The following portions of the patient's history were reviewed and updated as appropriate: allergies, current medications, past family history, past medical history, past social history, past surgical history and problem list.    Review of Systems   Constitutional:  Negative for activity change and appetite change. HENT:  Negative for voice change. Eyes:  Negative for visual disturbance. Respiratory:  Negative for chest tightness and shortness of breath. Cardiovascular:  Negative for chest pain, palpitations and leg swelling. Gastrointestinal:  Negative for abdominal pain, blood in stool, constipation and diarrhea. Genitourinary:  Negative for dysuria, vaginal bleeding and vaginal discharge. Skin:  Negative for rash. Neurological:  Negative for dizziness. Psychiatric/Behavioral:  Negative for dysphoric mood. Objective:  Vitals:    11/20/23 1123   BP: 122/80   BP Location: Left arm   Patient Position: Sitting   Cuff Size: Standard   Pulse: 63   Resp: 18   Temp: (!) 97 °F (36.1 °C)   TempSrc: Temporal   SpO2: 99%   Weight: 60.2 kg (132 lb 12.8 oz)   Height: 5' 6" (1.676 m)      Physical Exam  Constitutional:       Appearance: Normal appearance. HENT:      Head: Normocephalic and atraumatic.       Right Ear: Tympanic membrane and ear canal normal.      Left Ear: Tympanic membrane and ear canal normal.      Mouth/Throat:      Mouth: Mucous membranes are moist.      Pharynx: Oropharynx is clear. Eyes:      Conjunctiva/sclera: Conjunctivae normal.   Cardiovascular:      Rate and Rhythm: Normal rate and regular rhythm. Pulses: Normal pulses. Heart sounds: Normal heart sounds. No murmur heard. Pulmonary:      Effort: Pulmonary effort is normal. No respiratory distress. Breath sounds: Normal breath sounds. Musculoskeletal:      Cervical back: Neck supple. Lymphadenopathy:      Cervical: No cervical adenopathy. Neurological:      Mental Status: She is alert. Mental status is at baseline. Psychiatric:         Mood and Affect: Mood normal.         Thought Content: Thought content normal.           Patient's chronic problems that were reviewed today are stable. Recent hospital stays reviewed. Recent labs and imaging reviewed. Recent visits to other providers reviewed. Meds reviewed and no changes made. Appropriate labs and imaging were ordered. Preventive measures appropriate for age and sex were reviewed with patient. Immunizations were updated as appropriate.      Answers submitted by the patient for this visit:  Medicare Annual Wellness Visit (Submitted on 11/13/2023)  How would you rate your overall health?: very good  Compared to last year, how is your physical health?: same  In general, how satisfied are you with your life?: satisfied  Compared to last year, how is your eyesight?: same  Compared to last year, how is your hearing?: same  Compared to last year, how is your emotional/mental health?: much better  How often is anger a problem for you?: never, rarely  How often do you feel unusually tired/fatigued?: never, rarely  In the past 7 days, how much pain have you experienced?: none  In the past 6 months, have you lost or gained 10 pounds without trying?: No  One or more falls in the last year: No  In the past 6 months, have you accidentally leaked urine?: No  Do you have trouble with the stairs inside or outside your home?: No  Does your home have working smoke alarms?: Yes  Does your home have a carbon monoxide monitor?: No  Which safety hazards (if any) have you experienced in your home? Please select all that apply.: none  How would you describe your current diet?  Please select all that apply.: Regular, Low Carb  In addition to prescription medications, are you taking any over-the-counter supplements?: Yes  If yes, what supplements are you taking?: Calcium and multi-vitamins  Can you manage your medications?: Yes  Are you currently taking any opioid medications?: No  Can you walk and transfer into and out of your bed and chair?: Yes  Can you dress and groom yourself?: Yes  Can you bathe or shower yourself?: Yes  Can you feed yourself?: Yes  Can you do your laundry/ housekeeping?: Yes  Can you manage your money, pay your bills, and track your expenses?: Yes  Can you make your own meals?: Yes  Can you do your own shopping?: Yes  Within the last 12 months, have you had any hospitalizations or Emergency Department visits?: No  Do you have a living will?: Yes  Do you have a Durable POA (Power of ) for healthcare decisions?: No  Do you have an Advanced Directive for end of life decisions?: No  How often have you used an illegal drug (including marijuana) or a prescription medication for non-medical reasons in the past year?: never  What is the typical number of drinks you consume in a day?: 0  What is the typical number of drinks you consume in a week?: 2  How often did you have a drink containing alcohol in the past year?: monthly or less  How many drinks did you have on a typical day  when you were drinking in the past year?: 0  How often did you have 6 or more drinks on one occasion in the past year?: never

## 2023-11-20 NOTE — PROGRESS NOTES
Assessment and Plan:     Problem List Items Addressed This Visit       Allergic rhinitis    Colon polyp     Counseled on re-do scoping          Essential hypertension    Hypercholesterolemia    Age-related osteoporosis without current pathological fracture    Depression     Other Visit Diagnoses       Need for hepatitis C screening test    -  Primary    Essential hypertension, benign        Screening for colorectal cancer        Medicare annual wellness visit, subsequent                 Preventive health issues were discussed with patient, and age appropriate screening tests were ordered as noted in patient's After Visit Summary. Personalized health advice and appropriate referrals for health education or preventive services given if needed, as noted in patient's After Visit Summary.      History of Present Illness:     Patient presents for a Medicare Wellness Visit    HPI   Patient Care Team:  Jl Ambrose MD as PCP - General     Review of Systems:     Review of Systems     Problem List:     Patient Active Problem List   Diagnosis   • Allergic rhinitis   • Anxiety   • Colon polyp   • Essential hypertension   • Hypercholesterolemia   • Rash   • Age-related osteoporosis without current pathological fracture   • Depression      Past Medical and Surgical History:     Past Medical History:   Diagnosis Date   • Allergic    • HL (hearing loss) from birth    wear hearing aid   • Varicella 12years old    childhood     Past Surgical History:   Procedure Laterality Date   • BREAST BIOPSY Right 1996   • GYNECOLOGIC CRYOSURGERY        Family History:     Family History   Problem Relation Age of Onset   • Arthritis Mother    • No Known Problems Father    • No Known Problems Sister    • Breast cancer Cousin 72   • Breast cancer Other 59   • No Known Problems Maternal Grandmother    • No Known Problems Maternal Grandfather    • No Known Problems Paternal Grandmother    • No Known Problems Paternal Grandfather       Social History:     Social History     Socioeconomic History   • Marital status: Single     Spouse name: None   • Number of children: None   • Years of education: None   • Highest education level: None   Occupational History   • None   Tobacco Use   • Smoking status: Never   • Smokeless tobacco: Never   • Tobacco comments:     no exposure passive smoke   Vaping Use   • Vaping Use: Never used   Substance and Sexual Activity   • Alcohol use: Yes     Alcohol/week: 1.0 standard drink of alcohol     Types: 1 Glasses of wine per week     Comment: social   • Drug use: No   • Sexual activity: Yes     Partners: Male     Birth control/protection: Post-menopausal     Comment: boyfriend    Other Topics Concern   • None   Social History Narrative   • None     Social Determinants of Health     Financial Resource Strain: Low Risk  (11/13/2023)    Overall Financial Resource Strain (CARDIA)    • Difficulty of Paying Living Expenses: Not very hard   Food Insecurity: Not on file   Transportation Needs: No Transportation Needs (11/13/2023)    PRAPARE - Transportation    • Lack of Transportation (Medical): No    • Lack of Transportation (Non-Medical):  No   Physical Activity: Not on file   Stress: Not on file   Social Connections: Not on file   Intimate Partner Violence: Not on file   Housing Stability: Not on file      Medications and Allergies:     Current Outpatient Medications   Medication Sig Dispense Refill   • Ascorbic Acid (VITAMIN C PO) Take by mouth     • b complex vitamins tablet Take 1 tablet by mouth daily     • Calcium Carb-Cholecalciferol (Calcium Carbonate-Vitamin D3) 600-400 MG-UNIT TABS 2 DAILY     • fexofenadine (ALLEGRA) 180 MG tablet Take 1 tablet (180 mg total) by mouth daily 90 tablet 3   • lisinopril (ZESTRIL) 10 mg tablet Take 1 tablet (10 mg total) by mouth daily 90 tablet 3   • montelukast (SINGULAIR) 10 mg tablet Take 1 tablet (10 mg total) by mouth daily at bedtime 90 tablet 3   • Multiple Vitamins-Minerals (multivitamin with minerals) tablet Take 1 tablet by mouth daily     • Omega-3 Fatty Acids (FISH OIL PO) Take by mouth     • tacrolimus (PROTOPIC) 0.1 % ointment Apply topically in the morning 100 g 3     No current facility-administered medications for this visit. No Known Allergies   Immunizations:     Immunization History   Administered Date(s) Administered   • COVID-19 PFIZER VACCINE 0.3 ML IM 03/18/2021, 04/09/2021, 11/18/2021, 11/17/2022   • INFLUENZA 11/05/2015, 09/08/2016, 09/15/2017, 10/16/2023   • Influenza Split 10/22/2010, 11/18/2011, 11/28/2012, 09/10/2013, 11/26/2014, 11/05/2015   • Influenza Split High Dose Preservative Free IM 09/08/2016, 09/15/2017   • Influenza, high dose seasonal 0.7 mL 09/07/2018, 11/08/2019, 10/16/2020, 10/22/2021, 10/24/2022, 10/16/2023   • Influenza, seasonal, injectable 10/11/2007, 10/08/2008, 09/25/2009   • Pneumococcal Conjugate 13-Valent 05/07/2015   • Pneumococcal Polysaccharide PPV23 11/28/2012   • Tdap 10/08/2008   • Zoster 04/01/2016, 08/05/2016      Health Maintenance:         Topic Date Due   • Hepatitis C Screening  Never done         Topic Date Due   • COVID-19 Vaccine (5 - Pfizer series) 01/12/2023      Medicare Screening Tests and Risk Assessments:         Health Risk Assessment:   Patient rates overall health as very good. Patient feels that their physical health rating is same. Patient is satisfied with their life. Eyesight was rated as same. Hearing was rated as same. Patient feels that their emotional and mental health rating is much better. Patients states they are never, rarely angry. Patient states they are never, rarely unusually tired/fatigued. Pain experienced in the last 7 days has been none. Patient states that she has experienced no weight loss or gain in last 6 months. Fall Risk Screening:    In the past year, patient has experienced: no history of falling in past year      Urinary Incontinence Screening:   Patient has not leaked urine accidently in the last six months. Home Safety:  Patient does not have trouble with stairs inside or outside of their home. Patient has working smoke alarms and has no working carbon monoxide detector. Home safety hazards include: none. Nutrition:   Current diet is Regular and Low Carb. Medications:   Patient is currently taking over-the-counter supplements. OTC medications include: see medication list. Patient is able to manage medications. Activities of Daily Living (ADLs)/Instrumental Activities of Daily Living (IADLs):   Walk and transfer into and out of bed and chair?: Yes  Dress and groom yourself?: Yes    Bathe or shower yourself?: Yes    Feed yourself?  Yes  Do your laundry/housekeeping?: Yes  Manage your money, pay your bills and track your expenses?: Yes  Make your own meals?: Yes    Do your own shopping?: Yes    Previous Hospitalizations:   Any hospitalizations or ED visits within the last 12 months?: No      Hospitalization Comments: Eyes/ dental OK         Advance Care Planning:   Living will: Yes    Durable POA for healthcare: No    Advanced directive: Yes      Cognitive Screening:   Provider or family/friend/caregiver concerned regarding cognition?: No    PREVENTIVE SCREENINGS      Cardiovascular Screening:    General: Screening Not Indicated, History Lipid Disorder and Screening Current      Diabetes Screening:     General: Screening Current      Colorectal Cancer Screening:     General: Risks and Benefits Discussed      Breast Cancer Screening:     General: Screening Current      Cervical Cancer Screening:    General: Screening Not Indicated      Osteoporosis Screening:    General: Screening Not Indicated and History Osteoporosis      Abdominal Aortic Aneurysm (AAA) Screening:        General: Screening Not Indicated      Lung Cancer Screening:     General: Screening Not Indicated      Hepatitis C Screening:    General: Screening Not Indicated    Screening, Brief Intervention, and Referral to Treatment (SBIRT)    Screening  Typical number of drinks in a day: 0  Typical number of drinks in a week: 2  Interpretation: Low risk drinking behavior. AUDIT-C Screenin) How often did you have a drink containing alcohol in the past year? monthly or less  2) How many drinks did you have on a typical day when you were drinking in the past year? 0  3) How often did you have 6 or more drinks on one occasion in the past year? never    AUDIT-C Score: 1  Interpretation: Score 0-2 (female): Negative screen for alcohol misuse    Single Item Drug Screening:  How often have you used an illegal drug (including marijuana) or a prescription medication for non-medical reasons in the past year? never    Single Item Drug Screen Score: 0  Interpretation: Negative screen for possible drug use disorder    Brief Intervention  Alcohol & drug use screenings were reviewed. No concerns regarding substance use disorder identified. No results found.      Physical Exam:     /80 (BP Location: Left arm, Patient Position: Sitting, Cuff Size: Standard)   Pulse 63   Temp (!) 97 °F (36.1 °C) (Temporal)   Resp 18   Ht 5' 6" (1.676 m)   Wt 60.2 kg (132 lb 12.8 oz)   LMP  (LMP Unknown)   SpO2 99%   BMI 21.43 kg/m²     Physical Exam     Suzanne Vazquez MD

## 2023-11-20 NOTE — PATIENT INSTRUCTIONS
I recommend updating your tetanus shot with an adult DTaP. This is available at your local pharmacy. I think the advantage of getting an additional COVID booster at this time is some value. I will leave it up to you to decide. You should get a call off from the rheumatology doctors in the next week about the referral for the osteoporosis. Colonoscopy due soon. Medicare Preventive Visit Patient Instructions  Thank you for completing your Welcome to Medicare Visit or Medicare Annual Wellness Visit today. Your next wellness visit will be due in one year (11/20/2024). The screening/preventive services that you may require over the next 5-10 years are detailed below. Some tests may not apply to you based off risk factors and/or age. Screening tests ordered at today's visit but not completed yet may show as past due. Also, please note that scanned in results may not display below. Preventive Screenings:  Service Recommendations Previous Testing/Comments   Colorectal Cancer Screening  * Colonoscopy    * Fecal Occult Blood Test (FOBT)/Fecal Immunochemical Test (FIT)  * Fecal DNA/Cologuard Test  * Flexible Sigmoidoscopy Age: 43-73 years old   Colonoscopy: every 10 years (may be performed more frequently if at higher risk)  OR  FOBT/FIT: every 1 year  OR  Cologuard: every 3 years  OR  Sigmoidoscopy: every 5 years  Screening may be recommended earlier than age 39 if at higher risk for colorectal cancer. Also, an individualized decision between you and your healthcare provider will decide whether screening between the ages of 77-80 would be appropriate.  Colonoscopy: Not on file  FOBT/FIT: Not on file  Cologuard: Not on file  Sigmoidoscopy: Not on file    Risks and Benefits Discussed     Breast Cancer Screening Age: 36 years old  Frequency: every 1-2 years  Not required if history of left and right mastectomy Mammogram: 05/08/2023    Screening Current  Screening Current   Cervical Cancer Screening Between the ages of 24-27, pap smear recommended once every 3 years. Between the ages of 32-69, can perform pap smear with HPV co-testing every 5 years. Recommendations may differ for women with a history of total hysterectomy, cervical cancer, or abnormal pap smears in past. Pap Smear: 03/16/2022    Screening Not Indicated  Screening Not Indicated   Hepatitis C Screening Once for adults born between 1945 and 1965  More frequently in patients at high risk for Hepatitis C Hep C Antibody: Not on file    Screening Not Indicated   Diabetes Screening 1-2 times per year if you're at risk for diabetes or have pre-diabetes Fasting glucose: 107 mg/dL (3/24/2022)  A1C: No results in last 5 years (No results in last 5 years)  Screening Current   Cholesterol Screening Once every 5 years if you don't have a lipid disorder. May order more often based on risk factors. Lipid panel: 10/16/2020    Screening Not Indicated  History Lipid Disorder  Screening Not Indicated  History Lipid Disorder  Screening Current     Other Preventive Screenings Covered by Medicare:  Abdominal Aortic Aneurysm (AAA) Screening: covered once if your at risk. You're considered to be at risk if you have a family history of AAA. Lung Cancer Screening: covers low dose CT scan once per year if you meet all of the following conditions: (1) Age 48-67; (2) No signs or symptoms of lung cancer; (3) Current smoker or have quit smoking within the last 15 years; (4) You have a tobacco smoking history of at least 20 pack years (packs per day multiplied by number of years you smoked); (5) You get a written order from a healthcare provider.   Glaucoma Screening: covered annually if you're considered high risk: (1) You have diabetes OR (2) Family history of glaucoma OR (3)  aged 48 and older OR (3)  American aged 72 and older  Osteoporosis Screening: covered every 2 years if you meet one of the following conditions: (1) You're estrogen deficient and at risk for osteoporosis based off medical history and other findings; (2) Have a vertebral abnormality; (3) On glucocorticoid therapy for more than 3 months; (4) Have primary hyperparathyroidism; (5) On osteoporosis medications and need to assess response to drug therapy. Last bone density test (DXA Scan): 10/19/2023. HIV Screening: covered annually if you're between the age of 14-79. Also covered annually if you are younger than 13 and older than 72 with risk factors for HIV infection. For pregnant patients, it is covered up to 3 times per pregnancy. Immunizations:  Immunization Recommendations   Influenza Vaccine Annual influenza vaccination during flu season is recommended for all persons aged >= 6 months who do not have contraindications   Pneumococcal Vaccine   * Pneumococcal conjugate vaccine = PCV13 (Prevnar 13), PCV15 (Vaxneuvance), PCV20 (Prevnar 20)  * Pneumococcal polysaccharide vaccine = PPSV23 (Pneumovax) Adults 24-49 yo with certain risk factors or if 69+ yo  If never received any pneumonia vaccine: recommend Prevnar 20 (PCV20)  Give PCV20 if previously received 1 dose of PCV13 or PPSV23   Hepatitis B Vaccine 3 dose series if at intermediate or high risk (ex: diabetes, end stage renal disease, liver disease)   Respiratory syncytial virus (RSV) Vaccine - COVERED BY MEDICARE PART D  * RSVPreF3 (Arexvy) CDC recommends that adults 61years of age and older may receive a single dose of RSV vaccine using shared clinical decision-making (SCDM)   Tetanus (Td) Vaccine - COST NOT COVERED BY MEDICARE PART B Following completion of primary series, a booster dose should be given every 10 years to maintain immunity against tetanus. Td may also be given as tetanus wound prophylaxis. Tdap Vaccine - COST NOT COVERED BY MEDICARE PART B Recommended at least once for all adults. For pregnant patients, recommended with each pregnancy.    Shingles Vaccine (Shingrix) - COST NOT COVERED BY MEDICARE PART B  2 shot series recommended in those 19 years and older who have or will have weakened immune systems or those 50 years and older     Health Maintenance Due:      Topic Date Due   • Hepatitis C Screening  Never done     Immunizations Due:      Topic Date Due   • COVID-19 Vaccine (5 - Pfizer series) 01/12/2023     Advance Directives   What are advance directives? Advance directives are legal documents that state your wishes and plans for medical care. These plans are made ahead of time in case you lose your ability to make decisions for yourself. Advance directives can apply to any medical decision, such as the treatments you want, and if you want to donate organs. What are the types of advance directives? There are many types of advance directives, and each state has rules about how to use them. You may choose a combination of any of the following:  Living will: This is a written record of the treatment you want. You can also choose which treatments you do not want, which to limit, and which to stop at a certain time. This includes surgery, medicine, IV fluid, and tube feedings. Durable power of  for [facility]): This is a written record that states who you want to make healthcare choices for you when you are unable to make them for yourself. This person, called a proxy, is usually a family member or a friend. You may choose more than 1 proxy. Do not resuscitate (DNR) order:  A DNR order is used in case your heart stops beating or you stop breathing. It is a request not to have certain forms of treatment, such as CPR. A DNR order may be included in other types of advance directives. Medical directive: This covers the care that you want if you are in a coma, near death, or unable to make decisions for yourself. You can list the treatments you want for each condition. Treatment may include pain medicine, surgery, blood transfusions, dialysis, IV or tube feedings, and a ventilator (breathing machine).   Values history: This document has questions about your views, beliefs, and how you feel and think about life. This information can help others choose the care that you would choose. Why are advance directives important? An advance directive helps you control your care. Although spoken wishes may be used, it is better to have your wishes written down. Spoken wishes can be misunderstood, or not followed. Treatments may be given even if you do not want them. An advance directive may make it easier for your family to make difficult choices about your care. © Copyright [employer] 2018 Information is for End User's use only and may not be sold, redistributed or otherwise used for commercial purposes.  All illustrations and images included in CareNotes® are the copyrighted property of [employer] Inc. or [address] [place] [address]

## 2023-11-20 NOTE — PROGRESS NOTES
Assessment and Plan:     Problem List Items Addressed This Visit    None  Visit Diagnoses       Need for hepatitis C screening test    -  Primary             Preventive health issues were discussed with patient, and age appropriate screening tests were ordered as noted in patient's After Visit Summary. Personalized health advice and appropriate referrals for health education or preventive services given if needed, as noted in patient's After Visit Summary.      History of Present Illness:     Patient presents for a Medicare Wellness Visit    HPI   Patient Care Team:  Minerva Marcano MD as PCP - General     Review of Systems:     Review of Systems     Problem List:     Patient Active Problem List   Diagnosis    Allergic rhinitis    Anxiety    Colon polyp    Essential hypertension    Hypercholesterolemia    Rash    Age-related osteoporosis without current pathological fracture    Depression      Past Medical and Surgical History:     Past Medical History:   Diagnosis Date    Allergic     HL (hearing loss) from birth    wear hearing aid    Varicella 12years old    childhood     Past Surgical History:   Procedure Laterality Date    BREAST BIOPSY Right 1996    GYNECOLOGIC CRYOSURGERY        Family History:     Family History   Problem Relation Age of Onset    Arthritis Mother     No Known Problems Father     No Known Problems Sister     Breast cancer Cousin 72    Breast cancer Other 59    No Known Problems Maternal Grandmother     No Known Problems Maternal Grandfather     No Known Problems Paternal Grandmother     No Known Problems Paternal Grandfather       Social History:     Social History     Socioeconomic History    Marital status: Single     Spouse name: Not on file    Number of children: Not on file    Years of education: Not on file    Highest education level: Not on file   Occupational History    Not on file   Tobacco Use    Smoking status: Never    Smokeless tobacco: Never    Tobacco comments:     no exposure passive smoke   Vaping Use    Vaping Use: Never used   Substance and Sexual Activity    Alcohol use: Yes     Alcohol/week: 1.0 standard drink of alcohol     Types: 1 Glasses of wine per week     Comment: social    Drug use: No    Sexual activity: Yes     Partners: Male     Birth control/protection: Post-menopausal     Comment: boyfriend    Other Topics Concern    Not on file   Social History Narrative    Not on file     Social Determinants of Health     Financial Resource Strain: Low Risk  (11/13/2023)    Overall Financial Resource Strain (CARDIA)     Difficulty of Paying Living Expenses: Not very hard   Food Insecurity: Not on file   Transportation Needs: No Transportation Needs (11/13/2023)    PRAPARE - Transportation     Lack of Transportation (Medical): No     Lack of Transportation (Non-Medical):  No   Physical Activity: Not on file   Stress: Not on file   Social Connections: Not on file   Intimate Partner Violence: Not on file   Housing Stability: Not on file      Medications and Allergies:     Current Outpatient Medications   Medication Sig Dispense Refill    Ascorbic Acid (VITAMIN C PO) Take by mouth      b complex vitamins tablet Take 1 tablet by mouth daily      Calcium Carb-Cholecalciferol (Calcium Carbonate-Vitamin D3) 600-400 MG-UNIT TABS 2 DAILY      fexofenadine (ALLEGRA) 180 MG tablet Take 1 tablet (180 mg total) by mouth daily 90 tablet 3    lisinopril (ZESTRIL) 10 mg tablet Take 1 tablet (10 mg total) by mouth daily 90 tablet 3    montelukast (SINGULAIR) 10 mg tablet Take 1 tablet (10 mg total) by mouth daily at bedtime 90 tablet 3    Multiple Vitamins-Minerals (multivitamin with minerals) tablet Take 1 tablet by mouth daily      Omega-3 Fatty Acids (FISH OIL PO) Take by mouth      tacrolimus (PROTOPIC) 0.1 % ointment Apply topically in the morning 100 g 3    sertraline (ZOLOFT) 50 mg tablet TAKE ONE TABLET BY MOUTH EVERY DAY (Patient not taking: Reported on 11/20/2023) 90 tablet 6     No current facility-administered medications for this visit. No Known Allergies   Immunizations:     Immunization History   Administered Date(s) Administered    COVID-19 PFIZER VACCINE 0.3 ML IM 03/18/2021, 04/09/2021, 11/18/2021, 11/17/2022    INFLUENZA 11/05/2015, 09/08/2016, 09/15/2017, 10/16/2023    Influenza Split 10/22/2010, 11/18/2011, 11/28/2012, 09/10/2013, 11/26/2014, 11/05/2015    Influenza Split High Dose Preservative Free IM 09/08/2016, 09/15/2017    Influenza, high dose seasonal 0.7 mL 09/07/2018, 11/08/2019, 10/16/2020, 10/22/2021, 10/24/2022, 10/16/2023    Influenza, seasonal, injectable 10/11/2007, 10/08/2008, 09/25/2009    Pneumococcal Conjugate 13-Valent 05/07/2015    Pneumococcal Polysaccharide PPV23 11/28/2012    Tdap 10/08/2008    Zoster 04/01/2016, 08/05/2016      Health Maintenance:         Topic Date Due    Hepatitis C Screening  Never done         Topic Date Due    COVID-19 Vaccine (5 - Pfizer series) 01/12/2023      Medicare Screening Tests and Risk Assessments:     Del Macias is here for her Subsequent Wellness visit. Health Risk Assessment:   Patient rates overall health as very good. Patient feels that their physical health rating is same. Patient is satisfied with their life. Eyesight was rated as same. Hearing was rated as same. Patient feels that their emotional and mental health rating is much better. Patients states they are never, rarely angry. Patient states they are never, rarely unusually tired/fatigued. Pain experienced in the last 7 days has been none. Patient states that she has experienced no weight loss or gain in last 6 months. Fall Risk Screening: In the past year, patient has experienced: no history of falling in past year      Urinary Incontinence Screening:   Patient has not leaked urine accidently in the last six months. Home Safety:  Patient does not have trouble with stairs inside or outside of their home.  Patient has working smoke alarms and has no working carbon monoxide detector. Home safety hazards include: none. Nutrition:   Current diet is Regular and Low Carb. Medications:   Patient is currently taking over-the-counter supplements. OTC medications include: see medication list. Patient is able to manage medications. Activities of Daily Living (ADLs)/Instrumental Activities of Daily Living (IADLs):   Walk and transfer into and out of bed and chair?: Yes  Dress and groom yourself?: Yes    Bathe or shower yourself?: Yes    Feed yourself? Yes  Do your laundry/housekeeping?: Yes  Manage your money, pay your bills and track your expenses?: Yes  Make your own meals?: Yes    Do your own shopping?: Yes    Previous Hospitalizations:   Any hospitalizations or ED visits within the last 12 months?: No      Advance Care Planning:   Living will: Yes    Durable POA for healthcare: No    Advanced directive: Yes      PREVENTIVE SCREENINGS      Cardiovascular Screening:    General: Screening Not Indicated and History Lipid Disorder      Breast Cancer Screening:     General: Screening Current      Cervical Cancer Screening:    General: Screening Not Indicated      Osteoporosis Screening:    General: Screening Not Indicated and History Osteoporosis      Lung Cancer Screening:     General: Screening Not Indicated    Screening, Brief Intervention, and Referral to Treatment (SBIRT)    Screening  Typical number of drinks in a day: 0  Typical number of drinks in a week: 2  Interpretation: Low risk drinking behavior.     AUDIT-C Screenin) How often did you have a drink containing alcohol in the past year? monthly or less  2) How many drinks did you have on a typical day when you were drinking in the past year? 0  3) How often did you have 6 or more drinks on one occasion in the past year? never    AUDIT-C Score: 1  Interpretation: Score 0-2 (female): Negative screen for alcohol misuse    Single Item Drug Screening:  How often have you used an illegal drug (including marijuana) or a prescription medication for non-medical reasons in the past year? never    Single Item Drug Screen Score: 0  Interpretation: Negative screen for possible drug use disorder    No results found.      Physical Exam:     Ht 5' 6" (1.676 m)   Wt 60.2 kg (132 lb 12.8 oz)   LMP  (LMP Unknown)   BMI 21.43 kg/m²     Physical Exam     Anna Marie Meza MD

## 2023-11-21 DIAGNOSIS — M81.0 AGE-RELATED OSTEOPOROSIS WITHOUT CURRENT PATHOLOGICAL FRACTURE: Primary | ICD-10-CM

## 2023-11-21 RX ORDER — ALENDRONATE SODIUM 70 MG/1
70 TABLET ORAL
Qty: 12 TABLET | Refills: 3 | Status: SHIPPED | OUTPATIENT
Start: 2023-11-21

## 2023-11-21 RX ORDER — OMEPRAZOLE 20 MG/1
20 CAPSULE, DELAYED RELEASE ORAL DAILY
Qty: 30 CAPSULE | Refills: 12 | Status: SHIPPED | OUTPATIENT
Start: 2023-11-21

## 2023-12-04 DIAGNOSIS — I10 ESSENTIAL HYPERTENSION, BENIGN: ICD-10-CM

## 2023-12-04 RX ORDER — LISINOPRIL 10 MG/1
10 TABLET ORAL DAILY
Qty: 90 TABLET | Refills: 3 | Status: SHIPPED | OUTPATIENT
Start: 2023-12-04

## 2024-05-09 ENCOUNTER — HOSPITAL ENCOUNTER (OUTPATIENT)
Dept: RADIOLOGY | Age: 79
Discharge: HOME | End: 2024-05-09
Payer: COMMERCIAL

## 2024-05-09 VITALS — HEIGHT: 66 IN | BODY MASS INDEX: 21.05 KG/M2 | WEIGHT: 131 LBS

## 2024-05-09 DIAGNOSIS — Z12.31 VISIT FOR SCREENING MAMMOGRAM: ICD-10-CM

## 2024-05-09 PROCEDURE — 77067 SCR MAMMO BI INCL CAD: CPT

## 2024-05-09 PROCEDURE — 77063 BREAST TOMOSYNTHESIS BI: CPT

## 2024-05-15 ENCOUNTER — ANNUAL EXAM (OUTPATIENT)
Dept: OBGYN CLINIC | Facility: CLINIC | Age: 79
End: 2024-05-15
Payer: COMMERCIAL

## 2024-05-15 ENCOUNTER — RA CDI HCC (OUTPATIENT)
Dept: OTHER | Facility: HOSPITAL | Age: 79
End: 2024-05-15

## 2024-05-15 VITALS
DIASTOLIC BLOOD PRESSURE: 72 MMHG | BODY MASS INDEX: 21.92 KG/M2 | HEIGHT: 66 IN | SYSTOLIC BLOOD PRESSURE: 144 MMHG | WEIGHT: 136.4 LBS

## 2024-05-15 DIAGNOSIS — Z01.419 ENCOUNTER FOR GYNECOLOGICAL EXAMINATION WITHOUT ABNORMAL FINDING: Primary | ICD-10-CM

## 2024-05-15 PROCEDURE — G0101 CA SCREEN;PELVIC/BREAST EXAM: HCPCS | Performed by: OBSTETRICS & GYNECOLOGY

## 2024-05-15 RX ORDER — CETIRIZINE HYDROCHLORIDE 10 MG/1
10 TABLET ORAL DAILY
COMMUNITY

## 2024-05-15 NOTE — PROGRESS NOTES
Idalia Walkerr   1945    CC:  Yearly exam    S:  78 y.o. female here for yearly exam. She has been well since she saw Dr Figueroa last year.      She is postmenopausal and has had no vaginal bleeding.  She denies vaginal discharge, itching, odor or dryness.     Sexual activity: She is sexually active without pain, bleeding or dryness.     Last Pap: 2/24/2021 - normal; also normal 2018, 2016, 2014  Last Mammo: 5/9/2024 - BIRAD-1  Last Colonoscopy: 10+ years ago - due  Last DEXA: 10/19/2023 - osteoporosis; managed by PCP with alendronate    We reviewed ASCCP guidelines for Pap testing.     Current Outpatient Medications:     alendronate (Fosamax) 70 mg tablet, Take 1 tablet (70 mg total) by mouth every 7 days, Disp: 12 tablet, Rfl: 3    Ascorbic Acid (VITAMIN C PO), Take by mouth, Disp: , Rfl:     b complex vitamins tablet, Take 1 tablet by mouth daily, Disp: , Rfl:     Calcium Carb-Cholecalciferol (Calcium Carbonate-Vitamin D3) 600-400 MG-UNIT TABS, 2 DAILY, Disp: , Rfl:     cetirizine (ZyrTEC) 10 mg tablet, Take 10 mg by mouth daily, Disp: , Rfl:     fexofenadine (ALLEGRA) 180 MG tablet, Take 1 tablet (180 mg total) by mouth daily, Disp: 90 tablet, Rfl: 3    lisinopril (ZESTRIL) 10 mg tablet, Take 1 tablet (10 mg total) by mouth daily, Disp: 90 tablet, Rfl: 3    montelukast (SINGULAIR) 10 mg tablet, Take 1 tablet (10 mg total) by mouth daily at bedtime, Disp: 90 tablet, Rfl: 3    Multiple Vitamins-Minerals (multivitamin with minerals) tablet, Take 1 tablet by mouth daily, Disp: , Rfl:     Omega-3 Fatty Acids (FISH OIL PO), Take by mouth, Disp: , Rfl:     omeprazole (PriLOSEC) 20 mg delayed release capsule, Take 1 capsule (20 mg total) by mouth daily, Disp: 30 capsule, Rfl: 12    tacrolimus (PROTOPIC) 0.1 % ointment, Apply topically in the morning, Disp: 100 g, Rfl: 3  Social History     Socioeconomic History    Marital status: Single     Spouse name: Not on file    Number of children: Not on file    Years of  education: Not on file    Highest education level: Not on file   Occupational History    Not on file   Tobacco Use    Smoking status: Never    Smokeless tobacco: Never    Tobacco comments:     no exposure passive smoke   Vaping Use    Vaping status: Never Used   Substance and Sexual Activity    Alcohol use: Yes     Alcohol/week: 1.0 standard drink of alcohol     Types: 1 Glasses of wine per week     Comment: social    Drug use: No    Sexual activity: Yes     Partners: Male     Birth control/protection: Post-menopausal     Comment: boyfriend    Other Topics Concern    Not on file   Social History Narrative    Not on file     Social Determinants of Health     Financial Resource Strain: Low Risk  (11/13/2023)    Overall Financial Resource Strain (CARDIA)     Difficulty of Paying Living Expenses: Not very hard   Food Insecurity: Not on file   Transportation Needs: No Transportation Needs (11/13/2023)    PRAPARE - Transportation     Lack of Transportation (Medical): No     Lack of Transportation (Non-Medical): No   Physical Activity: Not on file   Stress: Not on file   Social Connections: Not on file   Intimate Partner Violence: Not on file   Housing Stability: Not on file     Family History   Problem Relation Age of Onset    Arthritis Mother     No Known Problems Father     No Known Problems Sister     Breast cancer Cousin 65    Breast cancer Other 64    No Known Problems Maternal Grandmother     No Known Problems Maternal Grandfather     No Known Problems Paternal Grandmother     No Known Problems Paternal Grandfather      Past Medical History:   Diagnosis Date    Allergic     HL (hearing loss) from birth    wear hearing aid    Varicella 8 years old    childhood        Review of Systems   Respiratory: Negative.    Cardiovascular: Negative.    Gastrointestinal: Negative for constipation and diarrhea.   Genitourinary: Negative for difficulty urinating, pelvic pain, vaginal bleeding, vaginal discharge, itching or  "odor.    O:  Blood pressure (!) 188/62, height 5' 6\" (1.676 m), weight 61.9 kg (136 lb 6.4 oz), not currently breastfeeding. Repeat 144/72    Patient appears well and is not in distress  Neck is supple without masses  Breasts are symmetrical without mass, tenderness, nipple discharge, skin changes or adenopathy.   Abdomen is soft and nontender without masses.   External genitals are normal without lesions or rashes.  Urethral meatus and urethra are normal  Bladder is normal to palpation  Vagina is normal without discharge or bleeding.   Cervix is normal without discharge or lesion.   Uterus is normal, mobile, nontender without palpable mass.  Exam limited by patient cooperation  Adnexa are normal, nontender, without palpable mass. Exam limited by patient cooperation    A:   Yearly exam.     P:   Pap no longer indicated given adequate screening in previous decade  Mammo scheduled   Colon cancer screening due - is going to do Cologuard which her PCP is sending   DEXA per PCP   Encouraged to follow up with PCP next week as scheduled but reassured by decrease in BP with rest    RTO one year for yearly exam or sooner as needed.     "

## 2024-05-21 ENCOUNTER — OFFICE VISIT (OUTPATIENT)
Dept: FAMILY MEDICINE CLINIC | Facility: CLINIC | Age: 79
End: 2024-05-21
Payer: COMMERCIAL

## 2024-05-21 VITALS
WEIGHT: 135.2 LBS | TEMPERATURE: 97.6 F | SYSTOLIC BLOOD PRESSURE: 132 MMHG | HEART RATE: 107 BPM | HEIGHT: 66 IN | DIASTOLIC BLOOD PRESSURE: 88 MMHG | OXYGEN SATURATION: 97 % | BODY MASS INDEX: 21.73 KG/M2

## 2024-05-21 DIAGNOSIS — E78.00 HYPERCHOLESTEROLEMIA: ICD-10-CM

## 2024-05-21 DIAGNOSIS — K63.5 POLYP OF COLON, UNSPECIFIED PART OF COLON, UNSPECIFIED TYPE: ICD-10-CM

## 2024-05-21 DIAGNOSIS — I10 ESSENTIAL HYPERTENSION: ICD-10-CM

## 2024-05-21 DIAGNOSIS — J30.9 ALLERGIC RHINITIS, UNSPECIFIED SEASONALITY, UNSPECIFIED TRIGGER: ICD-10-CM

## 2024-05-21 DIAGNOSIS — Z11.59 NEED FOR HEPATITIS C SCREENING TEST: Primary | ICD-10-CM

## 2024-05-21 DIAGNOSIS — M81.0 AGE-RELATED OSTEOPOROSIS WITHOUT CURRENT PATHOLOGICAL FRACTURE: ICD-10-CM

## 2024-05-21 PROCEDURE — 99214 OFFICE O/P EST MOD 30 MIN: CPT | Performed by: FAMILY MEDICINE

## 2024-05-21 PROCEDURE — G2211 COMPLEX E/M VISIT ADD ON: HCPCS | Performed by: FAMILY MEDICINE

## 2024-05-21 NOTE — PROGRESS NOTES
"Assessment/Plan:    No problem-specific Assessment & Plan notes found for this encounter.             Subjective:      Patient ID: Idlaia Wells is a 78 y.o. female.      PATIENT RETURNS FOR FOLLOW-UP OF CHRONIC MEDICAL CONDITIONS.  ANY HOSPITAL VISITS, EMERGENCY VISITS AND OTHER PROVIDER VISITS SINCE LAST TIME WERE REVIEWED.  MEDS WERE REVIEWED AND NO SIDE EFFECTS.  NO NEW ISSUES  UNLESS NOTED BELOW. NO NEW MEDICAL PROVIDER REPORTED. THE CHRONIC DISEASES LISTED ABOVE ARE STABLE AND UNCHANGED/ THE PLAN OF CARE FOR THOSE WILL REMAIN UNCHANGED UNLESS NOTED BELOW.              The following portions of the patient's history were reviewed and updated as appropriate: allergies, current medications, past family history, past medical history, past social history, past surgical history and problem list.    Review of Systems   Constitutional:  Negative for activity change and appetite change.   HENT:  Negative for voice change.    Eyes:  Negative for visual disturbance.   Respiratory:  Negative for chest tightness and shortness of breath.    Cardiovascular:  Negative for chest pain, palpitations and leg swelling.   Gastrointestinal:  Negative for abdominal pain, blood in stool, constipation and diarrhea.   Genitourinary:  Negative for dysuria, vaginal bleeding and vaginal discharge.   Skin:  Negative for rash.   Neurological:  Negative for dizziness.   Psychiatric/Behavioral:  Negative for dysphoric mood.          Objective:  Vitals:    05/21/24 1027   BP: 132/88   BP Location: Left arm   Patient Position: Sitting   Cuff Size: Adult   Pulse: (!) 107   Temp: 97.6 °F (36.4 °C)   TempSrc: Temporal   SpO2: 97%   Weight: 61.3 kg (135 lb 3.2 oz)   Height: 5' 6.4\" (1.687 m)      Physical Exam  Constitutional:       Appearance: Normal appearance.   HENT:      Head: Normocephalic and atraumatic.      Right Ear: Tympanic membrane and ear canal normal.      Left Ear: Tympanic membrane and ear canal normal.      Mouth/Throat:      " Mouth: Mucous membranes are moist.      Pharynx: Oropharynx is clear.   Eyes:      Conjunctiva/sclera: Conjunctivae normal.   Cardiovascular:      Rate and Rhythm: Normal rate and regular rhythm.      Pulses: Normal pulses.      Heart sounds: Normal heart sounds. No murmur heard.  Pulmonary:      Effort: Pulmonary effort is normal. No respiratory distress.      Breath sounds: Normal breath sounds.   Musculoskeletal:      Cervical back: Neck supple.   Lymphadenopathy:      Cervical: No cervical adenopathy.   Neurological:      Mental Status: She is alert. Mental status is at baseline.   Psychiatric:         Mood and Affect: Mood normal.         Thought Content: Thought content normal.           Patient's chronic problems that were reviewed today are stable. Recent hospital stays reviewed. Recent labs and imaging reviewed. Recent visits to other providers reviewed. Meds reviewed and no changes made. Appropriate labs and imaging were ordered. Preventive measures appropriate for age and sex were reviewed with patient. Immunizations were updated as appropriate.

## 2024-10-04 DIAGNOSIS — M81.0 AGE-RELATED OSTEOPOROSIS WITHOUT CURRENT PATHOLOGICAL FRACTURE: ICD-10-CM

## 2024-10-07 RX ORDER — ALENDRONATE SODIUM 70 MG/1
TABLET ORAL
Qty: 4 TABLET | Refills: 0 | Status: SHIPPED | OUTPATIENT
Start: 2024-10-07

## 2024-11-02 DIAGNOSIS — M81.0 AGE-RELATED OSTEOPOROSIS WITHOUT CURRENT PATHOLOGICAL FRACTURE: ICD-10-CM

## 2024-11-04 RX ORDER — ALENDRONATE SODIUM 70 MG/1
TABLET ORAL
Qty: 4 TABLET | Refills: 0 | Status: SHIPPED | OUTPATIENT
Start: 2024-11-04

## 2024-11-11 ENCOUNTER — APPOINTMENT (OUTPATIENT)
Dept: LAB | Facility: CLINIC | Age: 79
End: 2024-11-11
Payer: COMMERCIAL

## 2024-11-11 DIAGNOSIS — I10 ESSENTIAL HYPERTENSION: ICD-10-CM

## 2024-11-11 DIAGNOSIS — Z11.59 NEED FOR HEPATITIS C SCREENING TEST: ICD-10-CM

## 2024-11-11 LAB
ANION GAP SERPL CALCULATED.3IONS-SCNC: 7 MMOL/L (ref 4–13)
BUN SERPL-MCNC: 17 MG/DL (ref 5–25)
CALCIUM SERPL-MCNC: 9.3 MG/DL (ref 8.4–10.2)
CHLORIDE SERPL-SCNC: 100 MMOL/L (ref 96–108)
CO2 SERPL-SCNC: 30 MMOL/L (ref 21–32)
CREAT SERPL-MCNC: 0.79 MG/DL (ref 0.6–1.3)
CREAT UR-MCNC: 32.6 MG/DL
GFR SERPL CREATININE-BSD FRML MDRD: 72 ML/MIN/1.73SQ M
GLUCOSE P FAST SERPL-MCNC: 98 MG/DL (ref 65–99)
HCV AB SER QL: NORMAL
MICROALBUMIN UR-MCNC: 15 MG/L
MICROALBUMIN/CREAT 24H UR: 46 MG/G CREATININE (ref 0–30)
POTASSIUM SERPL-SCNC: 4.5 MMOL/L (ref 3.5–5.3)
SODIUM SERPL-SCNC: 137 MMOL/L (ref 135–147)

## 2024-11-11 PROCEDURE — 86803 HEPATITIS C AB TEST: CPT

## 2024-11-11 PROCEDURE — 80048 BASIC METABOLIC PNL TOTAL CA: CPT

## 2024-11-11 PROCEDURE — 36415 COLL VENOUS BLD VENIPUNCTURE: CPT

## 2024-11-11 PROCEDURE — 82570 ASSAY OF URINE CREATININE: CPT

## 2024-11-11 PROCEDURE — 82043 UR ALBUMIN QUANTITATIVE: CPT

## 2024-11-26 ENCOUNTER — OFFICE VISIT (OUTPATIENT)
Dept: FAMILY MEDICINE CLINIC | Facility: CLINIC | Age: 79
End: 2024-11-26
Payer: COMMERCIAL

## 2024-11-26 VITALS
DIASTOLIC BLOOD PRESSURE: 90 MMHG | SYSTOLIC BLOOD PRESSURE: 130 MMHG | TEMPERATURE: 97.6 F | HEIGHT: 66 IN | BODY MASS INDEX: 21.69 KG/M2 | WEIGHT: 135 LBS

## 2024-11-26 DIAGNOSIS — Z23 ENCOUNTER FOR IMMUNIZATION: ICD-10-CM

## 2024-11-26 DIAGNOSIS — M81.0 AGE-RELATED OSTEOPOROSIS WITHOUT CURRENT PATHOLOGICAL FRACTURE: ICD-10-CM

## 2024-11-26 DIAGNOSIS — J30.9 ALLERGIC RHINITIS, UNSPECIFIED SEASONALITY, UNSPECIFIED TRIGGER: ICD-10-CM

## 2024-11-26 DIAGNOSIS — I10 ESSENTIAL HYPERTENSION, BENIGN: ICD-10-CM

## 2024-11-26 DIAGNOSIS — J32.9 SINUSITIS, UNSPECIFIED CHRONICITY, UNSPECIFIED LOCATION: ICD-10-CM

## 2024-11-26 DIAGNOSIS — Z00.00 MEDICARE ANNUAL WELLNESS VISIT, SUBSEQUENT: Primary | ICD-10-CM

## 2024-11-26 PROCEDURE — 90662 IIV NO PRSV INCREASED AG IM: CPT

## 2024-11-26 PROCEDURE — G0008 ADMIN INFLUENZA VIRUS VAC: HCPCS

## 2024-11-26 PROCEDURE — G0439 PPPS, SUBSEQ VISIT: HCPCS

## 2024-11-26 RX ORDER — LISINOPRIL 10 MG/1
10 TABLET ORAL DAILY
Qty: 90 TABLET | Refills: 3 | Status: SHIPPED | OUTPATIENT
Start: 2024-11-26

## 2024-11-26 RX ORDER — ALENDRONATE SODIUM 70 MG/1
70 TABLET ORAL
Qty: 12 TABLET | Refills: 1 | Status: SHIPPED | OUTPATIENT
Start: 2024-11-26

## 2024-11-26 RX ORDER — FEXOFENADINE HCL 180 MG/1
180 TABLET ORAL DAILY
Qty: 90 TABLET | Refills: 3 | Status: SHIPPED | OUTPATIENT
Start: 2024-11-26

## 2024-11-26 RX ORDER — ALENDRONATE SODIUM 70 MG/1
70 TABLET ORAL
Qty: 4 TABLET | Refills: 6 | Status: SHIPPED | OUTPATIENT
Start: 2024-11-26 | End: 2024-11-26 | Stop reason: SDUPTHER

## 2024-11-26 NOTE — PATIENT INSTRUCTIONS
Medicare Preventive Visit Patient Instructions  Thank you for completing your Welcome to Medicare Visit or Medicare Annual Wellness Visit today. Your next wellness visit will be due in one year (11/27/2025).  The screening/preventive services that you may require over the next 5-10 years are detailed below. Some tests may not apply to you based off risk factors and/or age. Screening tests ordered at today's visit but not completed yet may show as past due. Also, please note that scanned in results may not display below.  Preventive Screenings:  Service Recommendations Previous Testing/Comments   Colorectal Cancer Screening  * Colonoscopy    * Fecal Occult Blood Test (FOBT)/Fecal Immunochemical Test (FIT)  * Fecal DNA/Cologuard Test  * Flexible Sigmoidoscopy Age: 45-75 years old   Colonoscopy: every 10 years (may be performed more frequently if at higher risk)  OR  FOBT/FIT: every 1 year  OR  Cologuard: every 3 years  OR  Sigmoidoscopy: every 5 years  Screening may be recommended earlier than age 45 if at higher risk for colorectal cancer. Also, an individualized decision between you and your healthcare provider will decide whether screening between the ages of 76-85 would be appropriate. Colonoscopy: Not on file  FOBT/FIT: Not on file  Cologuard: Not on file  Sigmoidoscopy: Not on file          Breast Cancer Screening Age: 40+ years old  Frequency: every 1-2 years  Not required if history of left and right mastectomy Mammogram: 05/09/2024    Screening Current   Cervical Cancer Screening Between the ages of 21-29, pap smear recommended once every 3 years.   Between the ages of 30-65, can perform pap smear with HPV co-testing every 5 years.   Recommendations may differ for women with a history of total hysterectomy, cervical cancer, or abnormal pap smears in past. Pap Smear: 05/15/2024    Screening Not Indicated   Hepatitis C Screening Once for adults born between 1945 and 1965  More frequently in patients at high  risk for Hepatitis C Hep C Antibody: 11/11/2024    Screening Current   Diabetes Screening 1-2 times per year if you're at risk for diabetes or have pre-diabetes Fasting glucose: 98 mg/dL (11/11/2024)  A1C: No results in last 5 years (No results in last 5 years)  Screening Current   Cholesterol Screening Once every 5 years if you don't have a lipid disorder. May order more often based on risk factors. Lipid panel: 10/16/2020    Screening Not Indicated  History Lipid Disorder     Other Preventive Screenings Covered by Medicare:  Abdominal Aortic Aneurysm (AAA) Screening: covered once if your at risk. You're considered to be at risk if you have a family history of AAA.  Lung Cancer Screening: covers low dose CT scan once per year if you meet all of the following conditions: (1) Age 55-77; (2) No signs or symptoms of lung cancer; (3) Current smoker or have quit smoking within the last 15 years; (4) You have a tobacco smoking history of at least 20 pack years (packs per day multiplied by number of years you smoked); (5) You get a written order from a healthcare provider.  Glaucoma Screening: covered annually if you're considered high risk: (1) You have diabetes OR (2) Family history of glaucoma OR (3)  aged 50 and older OR (4)  American aged 65 and older  Osteoporosis Screening: covered every 2 years if you meet one of the following conditions: (1) You're estrogen deficient and at risk for osteoporosis based off medical history and other findings; (2) Have a vertebral abnormality; (3) On glucocorticoid therapy for more than 3 months; (4) Have primary hyperparathyroidism; (5) On osteoporosis medications and need to assess response to drug therapy.   Last bone density test (DXA Scan): 10/19/2023.  HIV Screening: covered annually if you're between the age of 15-65. Also covered annually if you are younger than 15 and older than 65 with risk factors for HIV infection. For pregnant patients, it is  covered up to 3 times per pregnancy.    Immunizations:  Immunization Recommendations   Influenza Vaccine Annual influenza vaccination during flu season is recommended for all persons aged >= 6 months who do not have contraindications   Pneumococcal Vaccine   * Pneumococcal conjugate vaccine = PCV13 (Prevnar 13), PCV15 (Vaxneuvance), PCV20 (Prevnar 20)  * Pneumococcal polysaccharide vaccine = PPSV23 (Pneumovax) Adults 19-63 yo with certain risk factors or if 65+ yo  If never received any pneumonia vaccine: recommend Prevnar 20 (PCV20)  Give PCV20 if previously received 1 dose of PCV13 or PPSV23   Hepatitis B Vaccine 3 dose series if at intermediate or high risk (ex: diabetes, end stage renal disease, liver disease)   Respiratory syncytial virus (RSV) Vaccine - COVERED BY MEDICARE PART D  * RSVPreF3 (Arexvy) CDC recommends that adults 60 years of age and older may receive a single dose of RSV vaccine using shared clinical decision-making (SCDM)   Tetanus (Td) Vaccine - COST NOT COVERED BY MEDICARE PART B Following completion of primary series, a booster dose should be given every 10 years to maintain immunity against tetanus. Td may also be given as tetanus wound prophylaxis.   Tdap Vaccine - COST NOT COVERED BY MEDICARE PART B Recommended at least once for all adults. For pregnant patients, recommended with each pregnancy.   Shingles Vaccine (Shingrix) - COST NOT COVERED BY MEDICARE PART B  2 shot series recommended in those 19 years and older who have or will have weakened immune systems or those 50 years and older     Health Maintenance Due:      Topic Date Due   • Hepatitis C Screening  Completed     Immunizations Due:      Topic Date Due   • Influenza Vaccine (1) 09/01/2024   • COVID-19 Vaccine (6 - 2024-25 season) 09/01/2024     Advance Directives   What are advance directives?  Advance directives are legal documents that state your wishes and plans for medical care. These plans are made ahead of time in case  you lose your ability to make decisions for yourself. Advance directives can apply to any medical decision, such as the treatments you want, and if you want to donate organs.   What are the types of advance directives?  There are many types of advance directives, and each state has rules about how to use them. You may choose a combination of any of the following:  Living will:  This is a written record of the treatment you want. You can also choose which treatments you do not want, which to limit, and which to stop at a certain time. This includes surgery, medicine, IV fluid, and tube feedings.   Durable power of  for healthcare (DPAHC):  This is a written record that states who you want to make healthcare choices for you when you are unable to make them for yourself. This person, called a proxy, is usually a family member or a friend. You may choose more than 1 proxy.  Do not resuscitate (DNR) order:  A DNR order is used in case your heart stops beating or you stop breathing. It is a request not to have certain forms of treatment, such as CPR. A DNR order may be included in other types of advance directives.  Medical directive:  This covers the care that you want if you are in a coma, near death, or unable to make decisions for yourself. You can list the treatments you want for each condition. Treatment may include pain medicine, surgery, blood transfusions, dialysis, IV or tube feedings, and a ventilator (breathing machine).  Values history:  This document has questions about your views, beliefs, and how you feel and think about life. This information can help others choose the care that you would choose.  Why are advance directives important?  An advance directive helps you control your care. Although spoken wishes may be used, it is better to have your wishes written down. Spoken wishes can be misunderstood, or not followed. Treatments may be given even if you do not want them. An advance directive may  make it easier for your family to make difficult choices about your care.       © Copyright [employer] 2018 Information is for End User's use only and may not be sold, redistributed or otherwise used for commercial purposes. All illustrations and images included in CareNotes® are the copyrighted property of A.D.A.M., Inc. or [employer]

## 2024-11-26 NOTE — TELEPHONE ENCOUNTER
NOT A DUPLICATE:  Patient requesting 90 Day script    Reason for call:   [x] Refill   [] Prior Auth  [x] Other: 90 Day Script    Office:   [x] PCP/Provider -   [] Specialty/Provider -     Medication: alendronate (FOSAMAX) 70 mg     Dose/Frequency: Take 1 tablet (70 mg total) by mouth every 7 days     Quantity: 12    [facility] [address] [facility] [address] [place] [address] [place] [address]     Does the patient have enough for 3 days?   [] Yes   [x] No - Send as HP to POD

## 2024-11-26 NOTE — PROGRESS NOTES
Name: Idalia Wells      : 1945      MRN: [id number]  Encounter Provider: Justin Chaparro MD  Encounter Date: 2024   Encounter department: [facility]    Assessment & Plan  Medicare annual wellness visit, subsequent         Encounter for immunization  Orders:    influenza vaccine, high-dose, PF 0.5 mL (Fluzone High Dose)       Preventive health issues were discussed with patient, and age appropriate screening tests were ordered as noted in patient's After Visit Summary. Personalized health advice and appropriate referrals for health education or preventive services given if needed, as noted in patient's After Visit Summary.    History of Present Illness     Idalia Wells is a 79 y.o. female with pmhx of htn and anxiety presenting today for medicare annual wellness.     Patient has been doing well since last seen.     Is interested in the influenza vaccine.        Patient Care Team:  Jonathan Loredo DO as PCP - General (Family Medicine)  Barb Cheatham MD (Obstetrics and Gynecology)    Review of Systems   Constitutional:  Negative for chills and fever.   HENT:  Negative for ear pain and sore throat.    Eyes:  Negative for pain and visual disturbance.   Respiratory:  Negative for cough and shortness of breath.    Cardiovascular:  Negative for chest pain and palpitations.   Gastrointestinal:  Negative for abdominal pain and vomiting.   Genitourinary:  Negative for dysuria and hematuria.   Musculoskeletal:  Negative for arthralgias and back pain.   Skin:  Negative for color change and rash.   Neurological:  Negative for seizures and syncope.   All other systems reviewed and are negative.    Medical History Reviewed by provider this encounter:       Annual Wellness Visit Questionnaire   Idalia is here for her Initial Wellness visit.     Health Risk Assessment:   Patient rates overall health as very good. Patient feels that their physical health rating is  same. Patient is satisfied with their life. Eyesight was rated as same. Hearing was rated as same. Patient feels that their emotional and mental health rating is same. Patients states they are never, rarely angry. Patient states they are never, rarely unusually tired/fatigued. Pain experienced in the last 7 days has been none. Patient states that she has experienced no weight loss or gain in last 6 months.     Depression Screening:   PHQ-9 Score: 0      Fall Risk Screening:   In the past year, patient has experienced: no history of falling in past year      Urinary Incontinence Screening:   Patient has not leaked urine accidently in the last six months.     Home Safety:  Patient does not have trouble with stairs inside or outside of their home. Patient has working smoke alarms and has no working carbon monoxide detector. Home safety hazards include: none.     Nutrition:   Current diet is Regular.     Medications:   Patient is currently taking over-the-counter supplements. OTC medications include: see medication list. Patient is able to manage medications.     Activities of Daily Living (ADLs)/Instrumental Activities of Daily Living (IADLs):   Walk and transfer into and out of bed and chair?: Yes  Dress and groom yourself?: Yes    Bathe or shower yourself?: Yes    Feed yourself? Yes  Do your laundry/housekeeping?: Yes  Manage your money, pay your bills and track your expenses?: Yes  Make your own meals?: Yes    Do your own shopping?: Yes    Previous Hospitalizations:   Any hospitalizations or ED visits within the last 12 months?: No      Advance Care Planning:   Living will: Yes    Durable POA for healthcare: Yes    Advanced directive: Yes      PREVENTIVE SCREENINGS      Cardiovascular Screening:    General: Screening Not Indicated and History Lipid Disorder      Diabetes Screening:     General: Screening Current      Breast Cancer Screening:     General: Screening Current      Cervical Cancer Screening:    General:  Screening Not Indicated      Osteoporosis Screening:    General: Screening Not Indicated and History Osteoporosis      Lung Cancer Screening:     General: Screening Not Indicated      Hepatitis C Screening:    General: Screening Current    Screening, Brief Intervention, and Referral to Treatment (SBIRT)    Screening  Typical number of drinks in a day: 0  Typical number of drinks in a week: 1  Interpretation: Low risk drinking behavior.    AUDIT-C Screenin) How often did you have a drink containing alcohol in the past year? 2 to 4 times a month  2) How many drinks did you have on a typical day when you were drinking in the past year? 0  3) How often did you have 6 or more drinks on one occasion in the past year? never    AUDIT-C Score: 2  Interpretation: Score 0-2 (female): Negative screen for alcohol misuse    Single Item Drug Screening:  How often have you used an illegal drug (including marijuana) or a prescription medication for non-medical reasons in the past year? never    Single Item Drug Screen Score: 0  Interpretation: Negative screen for possible drug use disorder    Social Drivers of Health     Financial Resource Strain: Low Risk  (2023)    Overall Financial Resource Strain (CARDIA)     Difficulty of Paying Living Expenses: Not very hard   Food Insecurity: No Food Insecurity (2024)    Hunger Vital Sign     Worried About Running Out of Food in the Last Year: Never true     Ran Out of Food in the Last Year: Never true   Transportation Needs: No Transportation Needs (2024)    PRAPARE - Transportation     Lack of Transportation (Medical): No     Lack of Transportation (Non-Medical): No   Housing Stability: Low Risk  (2024)    Housing Stability Vital Sign     Unable to Pay for Housing in the Last Year: No     Number of Times Moved in the Last Year: 0     Homeless in the Last Year: No   Utilities: Not At Risk (2024)    [facility] Utilities     Threatened with loss of utilities: No  "    No results found.    Objective   /90 (BP Location: Left arm, Patient Position: Sitting, Cuff Size: Standard)   Temp 97.6 °F (36.4 °C) (Temporal)   Ht 5' 6.4\" (1.687 m)   Wt 61.2 kg (135 lb)   LMP  (LMP Unknown)   BMI 21.53 kg/m²     Physical Exam  Vitals and nursing note reviewed.   Constitutional:       General: She is not in acute distress.     Appearance: She is well-developed.   HENT:      Head: Normocephalic and atraumatic.   Eyes:      Conjunctiva/sclera: Conjunctivae normal.   Cardiovascular:      Rate and Rhythm: Normal rate and regular rhythm.      Heart sounds: No murmur heard.  Pulmonary:      Effort: Pulmonary effort is normal. No respiratory distress.      Breath sounds: Normal breath sounds.   Abdominal:      Palpations: Abdomen is soft.      Tenderness: There is no abdominal tenderness.   Musculoskeletal:         General: No swelling.      Cervical back: Neck supple.   Skin:     General: Skin is warm and dry.      Capillary Refill: Capillary refill takes less than 2 seconds.   Neurological:      Mental Status: She is alert.   Psychiatric:         Mood and Affect: Mood normal.         "

## 2025-01-10 DIAGNOSIS — I10 ESSENTIAL HYPERTENSION, BENIGN: ICD-10-CM

## 2025-01-10 RX ORDER — LISINOPRIL 10 MG/1
10 TABLET ORAL DAILY
Qty: 90 TABLET | Refills: 1 | Status: SHIPPED | OUTPATIENT
Start: 2025-01-10

## 2025-01-10 NOTE — TELEPHONE ENCOUNTER
Reason for call:   [x] Refill   [] Prior Auth  [x] Other: please resend script was never received by the pharmacy back in November     Office:   [x] PCP/Provider - Justin Chaparro MD   [] Specialty/Provider -     Medication: lisinopril (ZESTRIL) 10 mg tablet     Dose/Frequency: 10 mg    Quantity: 90    Pharmacy: [address] [place] - [address] [place] [address]    Does the patient have enough for 3 days?   [] Yes   [x] No - Send as HP to POD

## 2025-03-19 ENCOUNTER — VBI (OUTPATIENT)
Dept: ADMINISTRATIVE | Facility: OTHER | Age: 80
End: 2025-03-19

## 2025-03-19 NOTE — TELEPHONE ENCOUNTER
03/19/25 12:02 PM     Chart reviewed for Blood Pressure was/were not submitted to the patient's insurance.     Marlena Herndon MA   [facility]

## 2025-05-10 DIAGNOSIS — M81.0 AGE-RELATED OSTEOPOROSIS WITHOUT CURRENT PATHOLOGICAL FRACTURE: ICD-10-CM

## 2025-05-11 RX ORDER — ALENDRONATE SODIUM 70 MG/1
TABLET ORAL
Qty: 4 TABLET | Refills: 0 | Status: SHIPPED | OUTPATIENT
Start: 2025-05-11

## 2025-05-12 ENCOUNTER — HOSPITAL ENCOUNTER (OUTPATIENT)
Dept: RADIOLOGY | Age: 80
Discharge: HOME | End: 2025-05-12
Payer: COMMERCIAL

## 2025-05-12 DIAGNOSIS — Z12.31 VISIT FOR SCREENING MAMMOGRAM: ICD-10-CM

## 2025-05-12 PROCEDURE — 77063 BREAST TOMOSYNTHESIS BI: CPT

## 2025-05-12 PROCEDURE — 77067 SCR MAMMO BI INCL CAD: CPT

## 2025-05-28 ENCOUNTER — OFFICE VISIT (OUTPATIENT)
Dept: FAMILY MEDICINE CLINIC | Facility: CLINIC | Age: 80
End: 2025-05-28
Payer: COMMERCIAL

## 2025-05-28 VITALS
SYSTOLIC BLOOD PRESSURE: 118 MMHG | TEMPERATURE: 97.2 F | WEIGHT: 131.8 LBS | DIASTOLIC BLOOD PRESSURE: 80 MMHG | OXYGEN SATURATION: 99 % | RESPIRATION RATE: 18 BRPM | BODY MASS INDEX: 21.02 KG/M2 | HEART RATE: 74 BPM

## 2025-05-28 DIAGNOSIS — I10 ESSENTIAL HYPERTENSION: ICD-10-CM

## 2025-05-28 DIAGNOSIS — M79.622 LEFT UPPER ARM PAIN: Primary | ICD-10-CM

## 2025-05-28 PROCEDURE — 99214 OFFICE O/P EST MOD 30 MIN: CPT

## 2025-05-28 PROCEDURE — G2211 COMPLEX E/M VISIT ADD ON: HCPCS

## 2025-05-28 RX ORDER — IBUPROFEN 600 MG/1
600 TABLET, FILM COATED ORAL EVERY 8 HOURS SCHEDULED
Qty: 30 TABLET | Refills: 0 | Status: SHIPPED | OUTPATIENT
Start: 2025-05-28

## 2025-05-28 NOTE — ASSESSMENT & PLAN NOTE
Current BP: Blood Pressure: 118/80   JNC 8 Goal  <150/90 for >64 yo   Current Medications: Lisinopril 10 mg qd   Blood pressure is controlled on present treatment regimen.  Patient misses no doses and tolerates the meds without side effects.  Patient avoids salt.    PLAN:   - Discussed adhering to htn/dash diet including limiting salt to < 2.4 g daily   - Physical activity at least 4/5x weekly of moderate intense activity of 40 minutes   - Will do kidney function - microalbuminuria/creatinine ratio   - Advised to limit caffeine and alcohol   - No change in present meds. Continue HBPM, advise to bring logs to next visit

## 2025-05-28 NOTE — PROGRESS NOTES
Name: Idalia Wells      : 1945      MRN: [id number]  Encounter Provider: Justin Chaparro MD  Encounter Date: 2025   Encounter department: [facility]  :  Assessment & Plan  Left upper arm pain  Likely muscle strain, can consider frozen shoulder as well due to reduced functioning   Plan;   - light arm stretching  - Iburpofen 600 mg q8hrs x 5 days. Advised to take with food and drink a lot of water  - Consider Corticosteroid injection if no improvement  - PT if continue discomfort   Orders:  •  ibuprofen (MOTRIN) 600 mg tablet; Take 1 tablet (600 mg total) by mouth every 8 (eight) hours    Essential hypertension  Current BP: Blood Pressure: 118/80   JNC 8 Goal  <150/90 for >66 yo   Current Medications: Lisinopril 10 mg qd   Blood pressure is controlled on present treatment regimen.  Patient misses no doses and tolerates the meds without side effects.  Patient avoids salt.    PLAN:   - Discussed adhering to htn/dash diet including limiting salt to < 2.4 g daily   - Physical activity at least 4/5x weekly of moderate intense activity of 40 minutes   - Will do kidney function - microalbuminuria/creatinine ratio   - Advised to limit caffeine and alcohol   - No change in present meds. Continue HBPM, advise to bring logs to next visit                History of Present Illness   Idalia Wells is a 79 y.o. female with pmhx of htn presenting today review of chronic conditions.     She has been doing well but does report having some left arm pain for the last month.       Arm Pain   The incident occurred more than 1 week ago. The incident occurred at the gym. Injury mechanism: pressing down against the stationary bike as she stood up. The pain is present in the left forearm and upper left arm. The pain is moderate. The pain has been Constant since the incident. Associated symptoms include muscle weakness. Pertinent negatives include no chest pain. She has tried nothing for the  symptoms.     Review of Systems   Constitutional:  Negative for chills and fever.   HENT:  Negative for ear pain and sore throat.    Eyes:  Negative for pain and visual disturbance.   Respiratory:  Negative for cough and shortness of breath.    Cardiovascular:  Negative for chest pain and palpitations.   Gastrointestinal:  Negative for abdominal pain and vomiting.   Genitourinary:  Negative for dysuria and hematuria.   Musculoskeletal:  Negative for arthralgias and back pain.   Skin:  Negative for color change and rash.   Neurological:  Negative for seizures and syncope.   All other systems reviewed and are negative.      Objective   /80 (BP Location: Left arm, Patient Position: Sitting, Cuff Size: Standard)   Pulse 74   Temp (!) 97.2 °F (36.2 °C) (Temporal)   Resp 18   Wt 59.8 kg (131 lb 12.8 oz)   LMP  (LMP Unknown)   SpO2 99%   BMI 21.02 kg/m²      Physical Exam  Vitals and nursing note reviewed.   Constitutional:       General: She is not in acute distress.     Appearance: She is well-developed.   HENT:      Head: Normocephalic and atraumatic.     Eyes:      Conjunctiva/sclera: Conjunctivae normal.       Cardiovascular:      Rate and Rhythm: Normal rate and regular rhythm.      Heart sounds: No murmur heard.  Pulmonary:      Effort: Pulmonary effort is normal. No respiratory distress.      Breath sounds: Normal breath sounds.   Abdominal:      Palpations: Abdomen is soft.      Tenderness: There is no abdominal tenderness.     Musculoskeletal:         General: No swelling.      Left shoulder: No tenderness. Decreased range of motion (abduction to 90 and extension).      Cervical back: Neck supple.     Skin:     General: Skin is warm and dry.      Capillary Refill: Capillary refill takes less than 2 seconds.     Neurological:      Mental Status: She is alert.     Psychiatric:         Mood and Affect: Mood normal.

## 2025-06-06 DIAGNOSIS — M81.0 AGE-RELATED OSTEOPOROSIS WITHOUT CURRENT PATHOLOGICAL FRACTURE: ICD-10-CM

## 2025-06-09 RX ORDER — ALENDRONATE SODIUM 70 MG/1
TABLET ORAL
Qty: 4 TABLET | Refills: 0 | Status: SHIPPED | OUTPATIENT
Start: 2025-06-09

## 2025-07-29 DIAGNOSIS — I10 ESSENTIAL HYPERTENSION, BENIGN: ICD-10-CM

## 2025-07-30 RX ORDER — LISINOPRIL 10 MG/1
10 TABLET ORAL DAILY
Qty: 90 TABLET | Refills: 1 | Status: SHIPPED | OUTPATIENT
Start: 2025-07-30

## 2025-08-12 ENCOUNTER — TELEPHONE (OUTPATIENT)
Age: 80
End: 2025-08-12